# Patient Record
Sex: FEMALE | Race: WHITE | NOT HISPANIC OR LATINO | Employment: OTHER
[De-identification: names, ages, dates, MRNs, and addresses within clinical notes are randomized per-mention and may not be internally consistent; named-entity substitution may affect disease eponyms.]

---

## 2017-06-24 ENCOUNTER — HEALTH MAINTENANCE LETTER (OUTPATIENT)
Age: 62
End: 2017-06-24

## 2022-07-30 ENCOUNTER — HOSPITAL ENCOUNTER (INPATIENT)
Facility: CLINIC | Age: 67
LOS: 5 days | Discharge: HOME | DRG: 125 | End: 2022-08-04
Attending: EMERGENCY MEDICINE | Admitting: INTERNAL MEDICINE
Payer: COMMERCIAL

## 2022-07-30 ENCOUNTER — APPOINTMENT (OUTPATIENT)
Dept: CT IMAGING | Facility: CLINIC | Age: 67
DRG: 125 | End: 2022-07-30
Attending: EMERGENCY MEDICINE
Payer: COMMERCIAL

## 2022-07-30 DIAGNOSIS — Z11.52 ENCOUNTER FOR SCREENING LABORATORY TESTING FOR SEVERE ACUTE RESPIRATORY SYNDROME CORONAVIRUS 2 (SARS-COV-2): ICD-10-CM

## 2022-07-30 DIAGNOSIS — H05.011 ORBITAL CELLULITIS ON RIGHT: ICD-10-CM

## 2022-07-30 DIAGNOSIS — H05.00 ORBITAL INFLAMMATION, ACUTE: Primary | ICD-10-CM

## 2022-07-30 LAB
ANION GAP SERPL CALCULATED.3IONS-SCNC: 8 MMOL/L (ref 7–15)
BASOPHILS # BLD AUTO: 0.1 10E3/UL (ref 0–0.2)
BASOPHILS NFR BLD AUTO: 1 %
BUN SERPL-MCNC: 10 MG/DL (ref 8–23)
CALCIUM SERPL-MCNC: 9 MG/DL (ref 8.8–10.2)
CHLORIDE SERPL-SCNC: 101 MMOL/L (ref 98–107)
CREAT SERPL-MCNC: 0.77 MG/DL (ref 0.51–0.95)
CRP SERPL-MCNC: 26.8 MG/L
DEPRECATED HCO3 PLAS-SCNC: 27 MMOL/L (ref 22–29)
EOSINOPHIL # BLD AUTO: 0.3 10E3/UL (ref 0–0.7)
EOSINOPHIL NFR BLD AUTO: 3 %
ERYTHROCYTE [DISTWIDTH] IN BLOOD BY AUTOMATED COUNT: 11.7 % (ref 10–15)
GFR SERPL CREATININE-BSD FRML MDRD: 84 ML/MIN/1.73M2
GLUCOSE SERPL-MCNC: 128 MG/DL (ref 70–99)
HCT VFR BLD AUTO: 36.3 % (ref 35–47)
HGB BLD-MCNC: 12.1 G/DL (ref 11.7–15.7)
IMM GRANULOCYTES # BLD: 0 10E3/UL
IMM GRANULOCYTES NFR BLD: 0 %
LYMPHOCYTES # BLD AUTO: 1.8 10E3/UL (ref 0.8–5.3)
LYMPHOCYTES NFR BLD AUTO: 23 %
MCH RBC QN AUTO: 30.8 PG (ref 26.5–33)
MCHC RBC AUTO-ENTMCNC: 33.3 G/DL (ref 31.5–36.5)
MCV RBC AUTO: 92 FL (ref 78–100)
MONOCYTES # BLD AUTO: 0.7 10E3/UL (ref 0–1.3)
MONOCYTES NFR BLD AUTO: 9 %
NEUTROPHILS # BLD AUTO: 5.3 10E3/UL (ref 1.6–8.3)
NEUTROPHILS NFR BLD AUTO: 64 %
NRBC # BLD AUTO: 0 10E3/UL
NRBC BLD AUTO-RTO: 0 /100
PLATELET # BLD AUTO: 353 10E3/UL (ref 150–450)
POTASSIUM SERPL-SCNC: 4 MMOL/L (ref 3.4–5.3)
RBC # BLD AUTO: 3.93 10E6/UL (ref 3.8–5.2)
SARS-COV-2 RNA RESP QL NAA+PROBE: NEGATIVE
SODIUM SERPL-SCNC: 136 MMOL/L (ref 136–145)
TSH SERPL DL<=0.005 MIU/L-ACNC: 1.71 UIU/ML (ref 0.3–4.2)
WBC # BLD AUTO: 8.1 10E3/UL (ref 4–11)

## 2022-07-30 PROCEDURE — 82310 ASSAY OF CALCIUM: CPT | Performed by: EMERGENCY MEDICINE

## 2022-07-30 PROCEDURE — 96375 TX/PRO/DX INJ NEW DRUG ADDON: CPT | Performed by: EMERGENCY MEDICINE

## 2022-07-30 PROCEDURE — 250N000011 HC RX IP 250 OP 636: Performed by: EMERGENCY MEDICINE

## 2022-07-30 PROCEDURE — 86140 C-REACTIVE PROTEIN: CPT | Performed by: PHYSICIAN ASSISTANT

## 2022-07-30 PROCEDURE — 84443 ASSAY THYROID STIM HORMONE: CPT | Performed by: EMERGENCY MEDICINE

## 2022-07-30 PROCEDURE — 87040 BLOOD CULTURE FOR BACTERIA: CPT | Performed by: PHYSICIAN ASSISTANT

## 2022-07-30 PROCEDURE — 70480 CT ORBIT/EAR/FOSSA W/O DYE: CPT | Mod: 26 | Performed by: RADIOLOGY

## 2022-07-30 PROCEDURE — 250N000013 HC RX MED GY IP 250 OP 250 PS 637: Performed by: EMERGENCY MEDICINE

## 2022-07-30 PROCEDURE — 120N000002 HC R&B MED SURG/OB UMMC

## 2022-07-30 PROCEDURE — 36415 COLL VENOUS BLD VENIPUNCTURE: CPT | Performed by: EMERGENCY MEDICINE

## 2022-07-30 PROCEDURE — 99207 PR SERVICE NOT STAFFED W/SUPERV PROV: CPT | Performed by: STUDENT IN AN ORGANIZED HEALTH CARE EDUCATION/TRAINING PROGRAM

## 2022-07-30 PROCEDURE — G1010 CDSM STANSON: HCPCS

## 2022-07-30 PROCEDURE — 250N000013 HC RX MED GY IP 250 OP 250 PS 637: Performed by: PHYSICIAN ASSISTANT

## 2022-07-30 PROCEDURE — 99222 1ST HOSP IP/OBS MODERATE 55: CPT | Mod: AI | Performed by: INTERNAL MEDICINE

## 2022-07-30 PROCEDURE — 99207 PR APP CREDIT; MD BILLING SHARED VISIT: CPT | Performed by: PHYSICIAN ASSISTANT

## 2022-07-30 PROCEDURE — C9803 HOPD COVID-19 SPEC COLLECT: HCPCS | Performed by: EMERGENCY MEDICINE

## 2022-07-30 PROCEDURE — 99285 EMERGENCY DEPT VISIT HI MDM: CPT | Performed by: EMERGENCY MEDICINE

## 2022-07-30 PROCEDURE — 99285 EMERGENCY DEPT VISIT HI MDM: CPT | Mod: 25 | Performed by: EMERGENCY MEDICINE

## 2022-07-30 PROCEDURE — U0003 INFECTIOUS AGENT DETECTION BY NUCLEIC ACID (DNA OR RNA); SEVERE ACUTE RESPIRATORY SYNDROME CORONAVIRUS 2 (SARS-COV-2) (CORONAVIRUS DISEASE [COVID-19]), AMPLIFIED PROBE TECHNIQUE, MAKING USE OF HIGH THROUGHPUT TECHNOLOGIES AS DESCRIBED BY CMS-2020-01-R: HCPCS | Performed by: EMERGENCY MEDICINE

## 2022-07-30 PROCEDURE — 96366 THER/PROPH/DIAG IV INF ADDON: CPT | Performed by: EMERGENCY MEDICINE

## 2022-07-30 PROCEDURE — 258N000003 HC RX IP 258 OP 636: Performed by: EMERGENCY MEDICINE

## 2022-07-30 PROCEDURE — 96365 THER/PROPH/DIAG IV INF INIT: CPT | Mod: 59 | Performed by: EMERGENCY MEDICINE

## 2022-07-30 PROCEDURE — 36415 COLL VENOUS BLD VENIPUNCTURE: CPT | Performed by: PHYSICIAN ASSISTANT

## 2022-07-30 PROCEDURE — 85025 COMPLETE CBC W/AUTO DIFF WBC: CPT | Performed by: EMERGENCY MEDICINE

## 2022-07-30 PROCEDURE — 96361 HYDRATE IV INFUSION ADD-ON: CPT | Performed by: EMERGENCY MEDICINE

## 2022-07-30 RX ORDER — AMOXICILLIN 250 MG
1 CAPSULE ORAL 2 TIMES DAILY PRN
Status: DISCONTINUED | OUTPATIENT
Start: 2022-07-30 | End: 2022-08-04 | Stop reason: HOSPADM

## 2022-07-30 RX ORDER — ALENDRONATE SODIUM 70 MG/1
70 TABLET ORAL
Status: ON HOLD | COMMUNITY
End: 2022-08-04

## 2022-07-30 RX ORDER — IOPAMIDOL 755 MG/ML
75 INJECTION, SOLUTION INTRAVASCULAR ONCE
Status: COMPLETED | OUTPATIENT
Start: 2022-07-30 | End: 2022-07-30

## 2022-07-30 RX ORDER — MORPHINE SULFATE 4 MG/ML
4 INJECTION, SOLUTION INTRAMUSCULAR; INTRAVENOUS
Status: COMPLETED | OUTPATIENT
Start: 2022-07-30 | End: 2022-07-30

## 2022-07-30 RX ORDER — CETIRIZINE HYDROCHLORIDE 5 MG/1
5 TABLET ORAL DAILY
Status: DISCONTINUED | OUTPATIENT
Start: 2022-07-31 | End: 2022-08-04 | Stop reason: HOSPADM

## 2022-07-30 RX ORDER — ACETAMINOPHEN 325 MG/1
975 TABLET ORAL EVERY 6 HOURS PRN
Status: DISCONTINUED | OUTPATIENT
Start: 2022-07-30 | End: 2022-07-31

## 2022-07-30 RX ORDER — BIOTIN 1 MG
1000 TABLET ORAL DAILY
COMMUNITY

## 2022-07-30 RX ORDER — ACETAMINOPHEN 325 MG/1
975 TABLET ORAL ONCE
Status: COMPLETED | OUTPATIENT
Start: 2022-07-30 | End: 2022-07-30

## 2022-07-30 RX ORDER — PIPERACILLIN SODIUM, TAZOBACTAM SODIUM 4; .5 G/20ML; G/20ML
4.5 INJECTION, POWDER, LYOPHILIZED, FOR SOLUTION INTRAVENOUS EVERY 8 HOURS
Status: DISCONTINUED | OUTPATIENT
Start: 2022-07-30 | End: 2022-08-03

## 2022-07-30 RX ORDER — AZELASTINE HYDROCHLORIDE 137 UG/1
137 SPRAY, METERED NASAL DAILY PRN
COMMUNITY

## 2022-07-30 RX ORDER — ONDANSETRON 2 MG/ML
4 INJECTION INTRAMUSCULAR; INTRAVENOUS EVERY 6 HOURS PRN
Status: DISCONTINUED | OUTPATIENT
Start: 2022-07-30 | End: 2022-08-04 | Stop reason: HOSPADM

## 2022-07-30 RX ORDER — TRAZODONE HYDROCHLORIDE 100 MG/1
100 TABLET ORAL AT BEDTIME
COMMUNITY

## 2022-07-30 RX ORDER — ONDANSETRON 4 MG/1
4 TABLET, ORALLY DISINTEGRATING ORAL EVERY 6 HOURS PRN
Status: DISCONTINUED | OUTPATIENT
Start: 2022-07-30 | End: 2022-08-04 | Stop reason: HOSPADM

## 2022-07-30 RX ORDER — TRAZODONE HYDROCHLORIDE 100 MG/1
100 TABLET ORAL AT BEDTIME
Status: DISCONTINUED | OUTPATIENT
Start: 2022-07-30 | End: 2022-08-04 | Stop reason: HOSPADM

## 2022-07-30 RX ORDER — SODIUM CHLORIDE 9 MG/ML
INJECTION, SOLUTION INTRAVENOUS CONTINUOUS
Status: DISCONTINUED | OUTPATIENT
Start: 2022-07-30 | End: 2022-07-30

## 2022-07-30 RX ORDER — VITAMIN B COMPLEX
25 TABLET ORAL DAILY
Status: DISCONTINUED | OUTPATIENT
Start: 2022-07-31 | End: 2022-07-30

## 2022-07-30 RX ORDER — OXYCODONE HYDROCHLORIDE 5 MG/1
5 TABLET ORAL EVERY 4 HOURS PRN
Status: DISCONTINUED | OUTPATIENT
Start: 2022-07-30 | End: 2022-07-31

## 2022-07-30 RX ORDER — MONTELUKAST SODIUM 10 MG/1
10 TABLET ORAL AT BEDTIME
Status: DISCONTINUED | OUTPATIENT
Start: 2022-07-30 | End: 2022-07-30

## 2022-07-30 RX ORDER — LIDOCAINE 40 MG/G
CREAM TOPICAL
Status: DISCONTINUED | OUTPATIENT
Start: 2022-07-30 | End: 2022-08-04 | Stop reason: HOSPADM

## 2022-07-30 RX ORDER — AMOXICILLIN 250 MG
2 CAPSULE ORAL 2 TIMES DAILY PRN
Status: DISCONTINUED | OUTPATIENT
Start: 2022-07-30 | End: 2022-08-04 | Stop reason: HOSPADM

## 2022-07-30 RX ORDER — FEXOFENADINE HCL 180 MG/1
180 TABLET ORAL DAILY
COMMUNITY

## 2022-07-30 RX ORDER — AZELASTINE 1 MG/ML
2 SPRAY, METERED NASAL 2 TIMES DAILY
Status: DISCONTINUED | OUTPATIENT
Start: 2022-07-30 | End: 2022-07-31

## 2022-07-30 RX ADMIN — AZELASTINE HYDROCHLORIDE 2 SPRAY: 137 SPRAY, METERED NASAL at 22:03

## 2022-07-30 RX ADMIN — MORPHINE SULFATE 4 MG: 4 INJECTION INTRAVENOUS at 18:37

## 2022-07-30 RX ADMIN — ACETAMINOPHEN 975 MG: 325 TABLET, FILM COATED ORAL at 18:36

## 2022-07-30 RX ADMIN — SODIUM CHLORIDE: 9 INJECTION, SOLUTION INTRAVENOUS at 15:05

## 2022-07-30 RX ADMIN — IOPAMIDOL 75 ML: 755 INJECTION, SOLUTION INTRAVENOUS at 14:49

## 2022-07-30 RX ADMIN — PIPERACILLIN AND TAZOBACTAM 4.5 G: 4; .5 INJECTION, POWDER, FOR SOLUTION INTRAVENOUS at 19:36

## 2022-07-30 RX ADMIN — TRAZODONE HYDROCHLORIDE 100 MG: 100 TABLET ORAL at 22:03

## 2022-07-30 ASSESSMENT — ACTIVITIES OF DAILY LIVING (ADL)
ADLS_ACUITY_SCORE: 35

## 2022-07-30 NOTE — ED TRIAGE NOTES
Pressure over right eye and jaw Thursday that she thought was allergies. Yesterday her right eye hurt and was swollen,not able to focus for about 15-20 minutes before it resolved. This morning more swollen around eye and has some scleral edema. Feels like she may have a sinus infection.

## 2022-07-30 NOTE — CONSULTS
OPHTHALMOLOGY CONSULT NOTE  07/30/22    Patient: Alfreda Wing  Consulted by: Dr. Connolly  Reason for Consult: Right eye pain, swelling    HISTORY OF PRESENTING ILLNESS:     Alfreda Wing is a 67 year old female who presents for right eye periorbital edema, pain with EOMs and new right eye proptosis.    She has been having sinus problems (including sneezing, runny nose, pressure headaches) for the past few months that have worsened in the past 3 weeks. She started getting pain in her eyebrow, pressure in her ear, etc. Saw her PCP 2 weeks ago and allergist who thought it was sinus infection vs allergies. She started noticing worsening pain above the right eye on Wednesday 7/26/22. On Thursday 7/27/22 she noticed a little bit of swelling. Then when she awoke yesterday 7/29/22, she noticed her right eye was much more swollen. She has continued pain in that eye, worse with EOMs and looking to the right. She also started noticing blurry vision in the right eye at that time. She denies any discharge, flashes, floaters, loss of vision. She endorses binocular diplopia when looking left and right. She describes it as horizontal diplopia. It resolves when covering each eye. Denies any fevers, chills, n/v, jaw pain/claudication, difficulty standing from sitting, numbness, weakness, tingling. No trauma.      Review of systems were otherwise negative except for that which has been stated above.      OCULAR/MEDICAL/SURGICAL HISTORIES:     Past Ocular History:  Last eye exam: Fall 2021, follows with [facility]  Prior eye surgery/laser: Cataract extraction both eyes in 2018  Contact lens wear: None  Glasses: [place]  Eyedrops: None    Family History:  Mother, aunt, uncle with AMD. Father with macular hole?    Social History:  Never smoker. ,  in the room.    Medical History  - Osteoporosis: started weekly fosamax 1 week ago  - No diabetes or HTN      EXAMINATION:     Base Eye Exam     Visual Acuity        Right Left    Near cc 20/20-2 20/20-3          Tonometry (Tonopen, 2:44 PM)       Right Left    Pressure 23 21          Pupils       Pupils Dark Light Shape React APD    Right PERRL 4 2 Round Brisk None    Left PERRL 4 2 Round Brisk None          Visual Fields       Left Right     Full Full          Extraocular Movement       Right Left     Ortho Ortho     -.5 0 -.5   -.5  -.5   -.5 0 -.5    0 0 0   0  0   0 0 0      EOMs limited by pain           Dilation     Both eyes: 1.0% Mydriacyl, 2.5% Tj Synephrine @ 2:45 PM            Additional Tests     Color       Right Left    [place] 11/11 11/11            Slit Lamp and Fundus Exam     External Exam       Right Left    External Periorbital edema Normal          Slit Lamp Exam       Right Left    Lids/Lashes Ptosis worse on right, Upper and lower lid edema, minimal erythema, no TTP Ptosis    Conjunctiva/Sclera White and quiet White and quiet    Cornea Clear Clear    Anterior Chamber Tr cell post dilation Deep and quiet    Iris Dilated Dilated    Lens PCIOL, PCO inferonasal and superior PCIOL, s/p YAG    Vitreous Normal Normal          Fundus Exam       Right Left    Disc Normal temporal ppa    C/D Ratio 0.3 0.35    Macula Normal, blonde fundus Normal, blonde fundus    Vessels Normal Normal    Periphery Normal Normal                Labs/Studies/Imaging Performed:    TSH 1.71 (N)    CT orbit without contrast (7/30/22):  IMPRESSION: Right proptosis. Otherwise unremarkable exam.     ASSESSMENT/PLAN:     Alfreda Wing is a 67 year old female who presents with     # Right eye periorbital edema, proptosis  # Binocular diplopia  Patient presenting with progressive worsening of right eye periorbital edema and pain for the past 3 days. Recent history of sinus infections vs allergies worsening the past 3 weeks. Exam today notable for significant pain with EOMs as well as periorbital edema. Differential includes a preseptal vs orbital cellulitis vs orbital mass vs orbital  inflammatory syndrome vs thyroid eye disease. VA 20/20, color vision full, PERRL without APD all of which are reassuring. TSH within normal limits. Regardless, pain with EOMs and binocular diplopia in left and right gaze concerning for an orbital process. Orbital and preseptal cellulitis are high on the differential considering recent history of sinus infections. CT orbit without contrast done, only notable for proptosis and mucosal thickening of bilateral maxillary sinuses and ethmoid air cells. Regardless, clinical exam concerning for orbital cellulitis so we will treat accordingly.    RECOMMENDATIONS:  - Admit to medicine for IV antibiotic therapy with IV Zosyn 4.5g q8h  - Nasal decongestant spray TID   - Ophthalmology will continue to follow daily at this time    It is our pleasure to participate in this patient's care and treatment. Please contact us with any further questions or concerns.    Patient seen and discussed with senior resident Sujey Whalen MD and discussed with fellow Clarice Sánchez MD.    Luzmaria Park MD  Ophthalmology Resident, PGY-2

## 2022-07-31 ENCOUNTER — APPOINTMENT (OUTPATIENT)
Dept: MRI IMAGING | Facility: CLINIC | Age: 67
DRG: 125 | End: 2022-07-31
Payer: COMMERCIAL

## 2022-07-31 LAB
ALBUMIN SERPL BCG-MCNC: 3.8 G/DL (ref 3.5–5.2)
ALP SERPL-CCNC: 50 U/L (ref 35–104)
ALT SERPL W P-5'-P-CCNC: 19 U/L (ref 10–35)
ANION GAP SERPL CALCULATED.3IONS-SCNC: 8 MMOL/L (ref 7–15)
AST SERPL W P-5'-P-CCNC: 21 U/L (ref 10–35)
BILIRUB SERPL-MCNC: 0.4 MG/DL
BUN SERPL-MCNC: 8 MG/DL (ref 8–23)
CALCIUM SERPL-MCNC: 7.9 MG/DL (ref 8.8–10.2)
CHLORIDE SERPL-SCNC: 105 MMOL/L (ref 98–107)
CREAT SERPL-MCNC: 0.75 MG/DL (ref 0.51–0.95)
DEPRECATED HCO3 PLAS-SCNC: 25 MMOL/L (ref 22–29)
ERYTHROCYTE [DISTWIDTH] IN BLOOD BY AUTOMATED COUNT: 11.8 % (ref 10–15)
GFR SERPL CREATININE-BSD FRML MDRD: 86 ML/MIN/1.73M2
GLUCOSE SERPL-MCNC: 96 MG/DL (ref 70–99)
HCT VFR BLD AUTO: 35.3 % (ref 35–47)
HGB BLD-MCNC: 11.4 G/DL (ref 11.7–15.7)
MCH RBC QN AUTO: 30.1 PG (ref 26.5–33)
MCHC RBC AUTO-ENTMCNC: 32.3 G/DL (ref 31.5–36.5)
MCV RBC AUTO: 93 FL (ref 78–100)
PLATELET # BLD AUTO: 336 10E3/UL (ref 150–450)
POTASSIUM SERPL-SCNC: 3.8 MMOL/L (ref 3.4–5.3)
PROT SERPL-MCNC: 6.3 G/DL (ref 6.4–8.3)
RBC # BLD AUTO: 3.79 10E6/UL (ref 3.8–5.2)
SODIUM SERPL-SCNC: 138 MMOL/L (ref 136–145)
WBC # BLD AUTO: 7.5 10E3/UL (ref 4–11)

## 2022-07-31 PROCEDURE — 250N000011 HC RX IP 250 OP 636: Performed by: STUDENT IN AN ORGANIZED HEALTH CARE EDUCATION/TRAINING PROGRAM

## 2022-07-31 PROCEDURE — 85027 COMPLETE CBC AUTOMATED: CPT | Performed by: PHYSICIAN ASSISTANT

## 2022-07-31 PROCEDURE — 250N000011 HC RX IP 250 OP 636: Performed by: EMERGENCY MEDICINE

## 2022-07-31 PROCEDURE — 250N000013 HC RX MED GY IP 250 OP 250 PS 637: Performed by: PHYSICIAN ASSISTANT

## 2022-07-31 PROCEDURE — 99232 SBSQ HOSP IP/OBS MODERATE 35: CPT | Performed by: STUDENT IN AN ORGANIZED HEALTH CARE EDUCATION/TRAINING PROGRAM

## 2022-07-31 PROCEDURE — 70546 MR ANGIOGRAPH HEAD W/O&W/DYE: CPT

## 2022-07-31 PROCEDURE — 250N000013 HC RX MED GY IP 250 OP 250 PS 637: Performed by: STUDENT IN AN ORGANIZED HEALTH CARE EDUCATION/TRAINING PROGRAM

## 2022-07-31 PROCEDURE — 36415 COLL VENOUS BLD VENIPUNCTURE: CPT | Performed by: PHYSICIAN ASSISTANT

## 2022-07-31 PROCEDURE — A9585 GADOBUTROL INJECTION: HCPCS | Performed by: STUDENT IN AN ORGANIZED HEALTH CARE EDUCATION/TRAINING PROGRAM

## 2022-07-31 PROCEDURE — 70546 MR ANGIOGRAPH HEAD W/O&W/DYE: CPT | Mod: 26 | Performed by: RADIOLOGY

## 2022-07-31 PROCEDURE — 255N000002 HC RX 255 OP 636: Performed by: STUDENT IN AN ORGANIZED HEALTH CARE EDUCATION/TRAINING PROGRAM

## 2022-07-31 PROCEDURE — 80053 COMPREHEN METABOLIC PANEL: CPT | Performed by: PHYSICIAN ASSISTANT

## 2022-07-31 PROCEDURE — 120N000002 HC R&B MED SURG/OB UMMC

## 2022-07-31 RX ORDER — GADOBUTROL 604.72 MG/ML
0.1 INJECTION INTRAVENOUS ONCE
Status: COMPLETED | OUTPATIENT
Start: 2022-07-31 | End: 2022-07-31

## 2022-07-31 RX ORDER — IBUPROFEN 200 MG
400 TABLET ORAL EVERY 6 HOURS PRN
Status: DISCONTINUED | OUTPATIENT
Start: 2022-07-31 | End: 2022-08-04 | Stop reason: HOSPADM

## 2022-07-31 RX ORDER — ACETAMINOPHEN 325 MG/1
975 TABLET ORAL EVERY 6 HOURS
Status: DISCONTINUED | OUTPATIENT
Start: 2022-07-31 | End: 2022-08-04 | Stop reason: HOSPADM

## 2022-07-31 RX ORDER — ENOXAPARIN SODIUM 100 MG/ML
40 INJECTION SUBCUTANEOUS EVERY 24 HOURS
Status: DISCONTINUED | OUTPATIENT
Start: 2022-07-31 | End: 2022-08-04 | Stop reason: HOSPADM

## 2022-07-31 RX ADMIN — AZELASTINE HYDROCHLORIDE 2 SPRAY: 137 SPRAY, METERED NASAL at 07:37

## 2022-07-31 RX ADMIN — PIPERACILLIN AND TAZOBACTAM 4.5 G: 4; .5 INJECTION, POWDER, FOR SOLUTION INTRAVENOUS at 03:17

## 2022-07-31 RX ADMIN — ACETAMINOPHEN 975 MG: 325 TABLET, FILM COATED ORAL at 22:13

## 2022-07-31 RX ADMIN — ACETAMINOPHEN 975 MG: 325 TABLET, FILM COATED ORAL at 15:32

## 2022-07-31 RX ADMIN — PIPERACILLIN AND TAZOBACTAM 4.5 G: 4; .5 INJECTION, POWDER, FOR SOLUTION INTRAVENOUS at 12:03

## 2022-07-31 RX ADMIN — GADOBUTROL 5.9 ML: 604.72 INJECTION INTRAVENOUS at 18:33

## 2022-07-31 RX ADMIN — IBUPROFEN 400 MG: 200 TABLET, FILM COATED ORAL at 21:11

## 2022-07-31 RX ADMIN — OXYCODONE HYDROCHLORIDE 5 MG: 5 TABLET ORAL at 03:14

## 2022-07-31 RX ADMIN — ACETAMINOPHEN 975 MG: 325 TABLET, FILM COATED ORAL at 03:14

## 2022-07-31 RX ADMIN — IBUPROFEN 400 MG: 200 TABLET, FILM COATED ORAL at 11:01

## 2022-07-31 RX ADMIN — ENOXAPARIN SODIUM 40 MG: 40 INJECTION SUBCUTANEOUS at 15:32

## 2022-07-31 RX ADMIN — CETIRIZINE HYDROCHLORIDE 5 MG: 5 TABLET ORAL at 07:37

## 2022-07-31 RX ADMIN — PIPERACILLIN AND TAZOBACTAM 4.5 G: 4; .5 INJECTION, POWDER, FOR SOLUTION INTRAVENOUS at 20:27

## 2022-07-31 RX ADMIN — TRAZODONE HYDROCHLORIDE 100 MG: 100 TABLET ORAL at 22:13

## 2022-07-31 ASSESSMENT — ACTIVITIES OF DAILY LIVING (ADL)
VISION_MANAGEMENT: GLASSES
ADLS_ACUITY_SCORE: 35
CHANGE_IN_FUNCTIONAL_STATUS_SINCE_ONSET_OF_CURRENT_ILLNESS/INJURY: NO
ADLS_ACUITY_SCORE: 35
ADLS_ACUITY_SCORE: 20
ADLS_ACUITY_SCORE: 35
ADLS_ACUITY_SCORE: 35
DIFFICULTY_EATING/SWALLOWING: NO
ADLS_ACUITY_SCORE: 35
DRESSING/BATHING_DIFFICULTY: NO
WALKING_OR_CLIMBING_STAIRS_DIFFICULTY: NO
ADLS_ACUITY_SCORE: 20
WEAR_GLASSES_OR_BLIND: YES
ADLS_ACUITY_SCORE: 35
CONCENTRATING,_REMEMBERING_OR_MAKING_DECISIONS_DIFFICULTY: NO
DOING_ERRANDS_INDEPENDENTLY_DIFFICULTY: NO
DIFFICULTY_COMMUNICATING: NO
FALL_HISTORY_WITHIN_LAST_SIX_MONTHS: NO
ADLS_ACUITY_SCORE: 20
TOILETING_ISSUES: NO
ADLS_ACUITY_SCORE: 35
HEARING_DIFFICULTY_OR_DEAF: NO
ADLS_ACUITY_SCORE: 35
ADLS_ACUITY_SCORE: 20

## 2022-07-31 ASSESSMENT — ENCOUNTER SYMPTOMS
NEUROLOGICAL NEGATIVE: 1
EYE PAIN: 1
EYE REDNESS: 1
CARDIOVASCULAR NEGATIVE: 1
EYE DISCHARGE: 0
SINUS PRESSURE: 1
FEVER: 0
PHOTOPHOBIA: 0

## 2022-07-31 NOTE — PROGRESS NOTES
[facility]    Medicine Progress Note - Hospitalist Service, GOLD TEAM 8    Date of Admission:  7/30/2022    Assessment & Plan        Alfreda Wing is a 67 year old female admitted on 7/30/2022. She has a past medical history significant for osteoporosis, alcohol use, insomnia and seasonal allergies who presented to the ED with right eye periorbital edema and proptosis as well as binocular diplopia with clinical concerns for orbital cellulitis.    Orbital cellulitis of right eye  Right proptosis  Increased pain, swelling and redness of right in with proptosis in setting of allergies and sinus congestion. No fevers. CT orbits demonstrating right proptosis and mild mucosal thickening of bilateral maxillary sinuses and ethmoids. Ophthalmology consulted in ED who recommended treating for likely orbital cellulitis.   - Ophthalmology following, recommend MR brain and orbit-MRV to evaluate cavernous sinus.   - IV zosyn 4.5g q8h  - BCx2 (will be post-zosyn x1), CRP  - Tylenol 975 mg q6h PRN, ibuprofen q6h PRN, oxycodone 5 mg q4h PRN for severe pain    Seasonal allergies  Have been worse this year. Seen by allergist and PCP in week leading up to admission who felt symptoms related to allergies vs sinus infection. Had been prescribed montelukast but insurance wouldn't cover.   - Continue PTA zyrtec    Alcohol use disorder, in early remission  Has struggled with alcohol use and working with PCP for resources. Last drink on 7/25. Prior to this drinking 2-3 bottles of wine approximately 4 days per week. Low suspicion for withdrawal at this time.    Insomnia - Continue PTA trazodone 100 mg at bedtime  Osteoporosis - Takes fosamax on Mondays. Per patient, okay to hold vit D while admitted.       Diet: Regular Diet Adult    DVT Prophylaxis: Enoxaparin (Lovenox) SQ  Johnson Catheter: Not present  Central Lines: None  Cardiac Monitoring: None  Code Status: No CPR- Pre-arrest intubation OK       Disposition Plan      Expected Discharge Date: 08/02/2022                The patient's care was discussed with the Patient and Ophthalmology Consultant.    Phuong Bonilla MD  Hospitalist Service, GOLD TEAM 8  [facility]  Securely message with the Vocera Web Console (learn more here)  Text page via [facility] Paging/Directory   Please see signed in provider for up to date coverage information      Clinically Significant Risk Factors Present on Admission                          ______________________________________________________________________    Interval History   - Swelling has decreased, but still having significant pain with extraocular movements particularly when looking to the far right. Vision is stable.  - No fevers or chills.    Data reviewed today: I reviewed all medications, new labs and imaging results over the last 24 hours. I personally reviewed no images or EKG's today.    Physical Exam   Vital Signs: Temp: 97.5  F (36.4  C) Temp src: Oral BP: 113/70 Pulse: 76   Resp: 16 SpO2: 99 % O2 Device: None (Room air)    Weight: 131 lbs 0 oz  General Appearance: Alert, no distress  Eyes: PERRL, periorbital edema of R eye, redness of R conjunctival/sclera. L eye normal.   Respiratory: CTAB  Cardiovascular: RRR, no murmus  GI: soft, non-tender  Skin: warm and dry      Data   Recent Labs   Lab 07/31/22  0550 07/30/22  1312   WBC 7.5 8.1   HGB 11.4* 12.1   MCV 93 92    353    136   POTASSIUM 3.8 4.0   CHLORIDE 105 101   CO2 25 27   BUN 8.0 10.0   CR 0.75 0.77   ANIONGAP 8 8   DONIS 7.9* 9.0   GLC 96 128*   ALBUMIN 3.8  --    PROTTOTAL 6.3*  --    BILITOTAL 0.4  --    ALKPHOS 50  --    ALT 19  --    AST 21  --

## 2022-07-31 NOTE — PROGRESS NOTES
OPHTHALMOLOGY CONSULT PROGRESS NOTE  07/31/22    Patient: Alfreda Wing  Consulted by: Dr. Connolly  Reason for Consult: Right eye pain, swelling      INTERVAL HISTORY (7/1/22):     Patient feeling like things have improved since starting IV antibiotics. Less pain with EOMs but still some pain. Worst pain still in right gaze. Does feel like the swelling around the eye has decreased as well. No flashes, floaters, vision loss. No drainage from the eye. Her breathing does feel more clear since starting decongestant.    HISTORY OF PRESENTING ILLNESS:     Alfreda Wing is a 67 year old female who presents for right eye periorbital edema, pain with EOMs and new right eye proptosis.    She has been having sinus problems (including sneezing, runny nose, pressure headaches) for the past few months that have worsened in the past 3 weeks. She started getting pain in her eyebrow, pressure in her ear, etc. Saw her PCP 2 weeks ago and allergist who thought it was sinus infection vs allergies. She started noticing worsening pain above the right eye on Wednesday 7/26/22. On Thursday 7/27/22 she noticed a little bit of swelling. Then when she awoke yesterday 7/29/22, she noticed her right eye was much more swollen. She has continued pain in that eye, worse with EOMs and looking to the right. She also started noticing blurry vision in the right eye at that time. She denies any discharge, flashes, floaters, loss of vision. She endorses binocular diplopia when looking left and right. She describes it as horizontal diplopia. It resolves when covering each eye. Denies any fevers, chills, n/v, jaw pain/claudication, difficulty standing from sitting, numbness, weakness, tingling. No trauma.      Review of systems were otherwise negative except for that which has been stated above.      EXAMINATION:     Base Eye Exam     Visual Acuity       Right Left    Near cc 20/20 20/20          Tonometry (Tonopen, 11:15 AM)       Right Left     Pressure 17 17          Pupils       Pupils    Right PERRL    Left PERRL          Visual Fields       Left Right     Full Full          Extraocular Movement       Right Left     - trace - trace - trace   -1  0    - trace - trace - trace    0 0 0   0  0   0 0 0               Slit Lamp and Fundus Exam     External Exam       Right Left    External Improving periorbital edema Normal          Slit Lamp Exam       Right Left    Lids/Lashes Ptosis worse on right, Upper and lower lid edema improved, erythema resolved, no TTP Ptosis    Conjunctiva/Sclera Tr injection White and quiet    Cornea Clear Clear    Anterior Chamber Deep and quiet Deep and quiet    Iris Round and reactive Round and reactive    Lens PCIOL, PCO inferonasal and superior PCIOL, s/p YAG    Vitreous Normal Normal                Labs/Studies/Imaging Performed:    TSH 1.71 (N)    CT orbit without contrast (7/30/22):  IMPRESSION: Right proptosis. Otherwise unremarkable exam.     ASSESSMENT/PLAN:     Alfreda Wing is a 67 year old female who presents with     # Right eye periorbital edema, proptosis  # Binocular diplopia  Patient presenting with progressive worsening of right eye periorbital edema and pain for the past 3 days. Recent history of sinus infections vs allergies worsening the past 3 weeks. Exam today notable for significant pain with EOMs as well as periorbital edema. Differential includes a preseptal vs orbital cellulitis vs orbital mass vs orbital inflammatory syndrome vs thyroid eye disease. VA 20/20, color vision full, PERRL without APD all of which are reassuring. TSH within normal limits. Regardless, pain with EOMs and binocular diplopia in left and right gaze concerning for an orbital process. Orbital and preseptal cellulitis are high on the differential considering recent history of sinus infections. CT orbit without contrast done, only notable for proptosis and mucosal thickening of bilateral maxillary sinuses and ethmoid air cells.  Regardless, clinical exam concerning for orbital cellulitis so we will treat accordingly.    7/31/22: Patient still with painful EOMs and diplopia, worse in abduction. No diplopia in primary gaze. Still with ptosis worse in the right eye. Symptoms concerning for multiple cranial nerve palsies and concern for cavernous sinus involvement.    RECOMMENDATIONS:  - MRI/MRV brain and orbits with and without contrast  - Continue IV Zosyn 4.5g q8h  - Nasal decongestant spray TID   - Ophthalmology will continue to follow daily at this time    It is our pleasure to participate in this patient's care and treatment. Please contact us with any further questions or concerns.    Patient seen and discussed with senior resident Sujey Whalen MD and discussed with fellow Clarice Sánchez MD.    Luzmarai Park MD  Ophthalmology Resident, PGY-2

## 2022-07-31 NOTE — PROGRESS NOTES
"Shift: [id number]    VS: /65 (BP Location: Left arm)   Pulse 64   Temp 97.5  F (36.4  C) (Oral)   Resp 16   Ht 1.575 m (5' 2\")   Wt 59.4 kg (131 lb)   SpO2 98%   BMI 23.96 kg/m      Pain: 7/10 right eye, takes tylenol and oxycodone for pain  Neuro:A&O x4  Cardio: WDL  Respiratory: Room air   Diet: Regular  GI/: Able to walk to bathroom independently   LDA; PIV Right arm SL   Skin: Swelling around right eye  Activity: Independent  Tests:  Labs: Blood cultures in process, however 1 dose of zosyn was given prior to blood culture collection, MD aware  Plan: Zosyn q 8 hours, ophthalmology to follow daily    "

## 2022-07-31 NOTE — H&P
[facility]    History and Physical - Hospitalist Service, GOLD TEAM        Date of Admission:  7/30/2022    Assessment & Plan      Alfreda Wing is a 67 year old female admitted on 7/30/2022. She has a past medical history significant for osteoporosis, alcohol use, insomnia and seasonal allergies who presented to the ED with right eye periorbital edema and proptosis as well as binocular diplopia with clinical concerns for orbital cellulitis.    Orbital cellulitis of right eye  Right proptosis  Increased pain, swelling and redness of right in with proptosis in setting of allergies and sinus congestion. No fevers. CT orbits demonstrating right proptosis and mild mucosal thickening of bilateral maxillary sinuses and ethmoids. Ophthalmology consulted in ED who recommended treating for likely orbital cellulitis.   - Ophthalmology following  - IV zosyn 4.5g q8h  - BCx2 (will be post-zosyn x1), CRP  - Tylenol 975 mg q6h PRN, oxycodone 5 mg q4h PRN for severe pain    Seasonal allergies  Have been worse this year. Seen by allergist and PCP in week leading up to admission who felt symptoms related to allergies vs sinus infection. Had been prescribed montelukast but insurance wouldn't cover.   - Continue PTA zyrtec, azelastine spray    Alcohol use disorder, in early remission  Has struggled with alcohol use and working with PCP for resources. Last drink on 7/25. Prior to this drinking 2-3 bottles of wine approximately 4 days per week. Low suspicion for withdrawal at this time.    Insomnia - Continue PTA trazodone 100 mg at bedtime  Osteoporosis - Takes fosamax on Mondays. Per patient, okay to hold vit D while admitted.       Diet: Regular Diet Adult  DVT Prophylaxis: Pneumatic Compression Devices  Johnson Catheter: Not present  Central Lines: None  Cardiac Monitoring: None  Code Status: No CPR- Pre-arrest intubation OK    Clinically Significant Risk Factors Present on Admission                           Disposition Plan      Expected Discharge Date: 08/02/2022                The patient's care was discussed with the Attending Physician, Dr. Khan, Patient and Patient's Family.    Marielena Rincon PA-C  Hospitalist Service, [facility]  Securely message with the Vocera Web Console (learn more here)  Text page via [facility] Paging/Directory   Please see signed in provider for up to date coverage information  ______________________________________________________________________    Chief Complaint   Right eye pain    History is obtained from the patient    History of Present Illness   Alfreda Wing is a 67 year old female who presented to the ED today with a 3-4 days of right eye pain. Has been struggling with severe allergies this year and notes in the past few weeks increased sinus pressure and pain in her ear. Was seen by both her PCP and an allergist in the past few days who felt symptoms were related to allergies. Subsequently developed worse pain above her right eye on Wednesday and has progressed to swelling and pain surround her eye, inability to close her right eye and diplopia with EOMs. Visual acuity is otherwise intact at rest. Does still have pain in her right ear as well.     In regards to alcohol use, reports last drink was on Monday with 2-3 glasses of wine and has been sober since. First week of July had binged (1-2 bottles of wine) approximately 4 days a week.     Review of Systems    The 10 point Review of Systems is negative other than noted in the HPI or here.     Past Medical History    I have reviewed this patient's medical history and updated it with pertinent information if needed.   No past medical history on file.    Past Surgical History   I have reviewed this patient's surgical history and updated it with pertinent information if needed.  No past surgical history on file.    Social History   I have reviewed this  patient's social history and updated it with pertinent information if needed.       Family History     Family history of stroke in grandparent.     Prior to Admission Medications   Prior to Admission Medications   Prescriptions Last Dose Informant Patient Reported? Taking?   Azelastine HCl 137 MCG/SPRAY SOLN Unknown at Unknown time Self Yes Yes   Sig: Spray 137 mcg in nostril daily as needed   alendronate (FOSAMAX) 70 MG tablet  Self Yes Yes   Sig: Take 70 mg by mouth every 7 days   biotin 1000 MCG TABS tablet Unknown at Unknown time Self Yes Yes   Sig: Take 1,000 mcg by mouth daily   fexofenadine (ALLEGRA) 180 MG tablet Unknown at am Self Yes Yes   Sig: Take 180 mg by mouth daily   traZODone (DESYREL) 100 MG tablet Unknown at pm Self Yes Yes   Sig: Take 100 mg by mouth At Bedtime      Facility-Administered Medications: None     Allergies   Allergies   Allergen Reactions     Clarithromycin Hives     Doxycycline Hives     Sulfa Drugs Hives       Physical Exam   Vital Signs: Temp: 97.5  F (36.4  C) Temp src: Oral BP: 120/60 Pulse: 69   Resp: 16 SpO2: 97 % O2 Device: None (Room air)    Weight: 131 lbs 0 oz    General Appearance: Awake. Alert and oriented x4. No acute distress.  Eyes: Normal lids. Right eye with scleral injection and tearing. Mild ptosis of right eye. Some mild erythema and edema surround right eye.   HEENT: Normo cephalic, atraumatic. Nares patent. Oropharynx clear. Mucous membranes moist.  Respiratory: Normal work of breathing on room air. Lungs CTAB. No wheezes.  Cardiovascular: RRR. S1, S2. No murmurs. Pedal pulses 2+ No lower extremity edema.  GI: Abdomen non-distended. Normoactive. Soft, non-tender. No guarding.  Lymph/Hematologic: No abnormal or excessive bruising on exposed skin.  Skin: Warm, dry. No jaundice. No rashes on exposed skin.  Musculoskeletal: Moves all extremities freely.  Neurologic: No focal deficits.    Data   Data reviewed today: I reviewed all medications, new labs and imaging  results over the last 24 hours. I personally reviewed no images or EKG's today.    Recent Labs   Lab 07/30/22  1312   WBC 8.1   HGB 12.1   MCV 92         POTASSIUM 4.0   CHLORIDE 101   CO2 27   BUN 10.0   CR 0.77   ANIONGAP 8   DONIS 9.0   *     Recent Results (from the past 24 hour(s))   CT Orbits wo Contrast    Narrative    EXAM: CT ORBITAL W/O CONTRAST  7/30/2022 2:57 PM     HISTORY:  Question of right proptosis       COMPARISON: None available      TECHNIQUE: Using thin collimation multidetector helical acquisition  technique, axial, coronal, sagittal CT images were obtained through  the orbits and upper facial bones, following intravenous  administration of nonionic iodinated contrast medium.    CONTRAST: Isovue 370    FINDINGS: No preseptal, extraconal, or intraconal edema. No abnormal  enhancement. No intraorbital mass, hematoma or air. Intact globes  bilaterally. Mild right proptosis. Bilateral pseudophakia.     No orbital wall fracture. Mild mucosal thickening of bilateral  maxillary sinuses and ethmoid air cells, otherwise the visualized  portion of the paranasal sinuses and mastoid air cells are clear. .  Intact cribriform plate. Visualized dentition is within normal limits.    No focal abnormality of the visualized intracranial structures.      Impression    IMPRESSION: Right proptosis. Otherwise unremarkable exam.    I have personally reviewed the examination and initial interpretation  and I agree with the findings.    PRINCE BRUCE MD         SYSTEM ID:  [id number]

## 2022-07-31 NOTE — PROGRESS NOTES
Paged Dr. Sergey HOWARD on call as orders were received for blood cultures but zosyn has been given.

## 2022-07-31 NOTE — PLAN OF CARE
Admitted/transferred from: Emergency Department  2 RN full   skin assessment completed by La Abbasi, RN and Lilia RN.  Skin assessment finding: skin intact, no problems   Interventions/actions: other continue to monitor      Bedside Emergency Equipment Present:  Suction Regulator: Yes  Suction Canister: Yes  Tubing between Regulator and Canister: Yes  O2 Regulator with Tree: Yes  Ambu Bag: Yes     Activity: up ad javy  Neuros:alert and oriented x 4, swelling right eye  Cardiac:denies chest pain  Respiratory:room air  GI/:LBM 7/28, voiding  Diet:regular  Skin:clean, dry, intact  Lines/Drains:PIV saline locked  Plan: MRI - paperwork filled out and sent to MRI

## 2022-07-31 NOTE — ED PROVIDER NOTES
"ED Provider Note  [facility]      History     Chief Complaint   Patient presents with     Eye Problem     HPI  Alfreda Wing is a 67 year old female who is transferred from outside hospital for evaluation of right periorbital edema pain with extraocular movement and concern for proptosis.  The ophthalmology resident is aware the patient is coming.  Been no history of trauma no systemic illnesses she has no significant ocular history.  No recent illnesses she is COVID vaccinated.  Visual acuity is unaffected.  She has no thyroid disease.    Past Medical History  No past medical history on file.  No past surgical history on file.  alendronate (FOSAMAX) 70 MG tablet  Azelastine HCl 137 MCG/SPRAY SOLN  biotin 1000 MCG TABS tablet  fexofenadine (ALLEGRA) 180 MG tablet  traZODone (DESYREL) 100 MG tablet      Allergies   Allergen Reactions     Clarithromycin Hives     Doxycycline Hives     Sulfa Drugs Hives     Family History  No family history on file.  Social History       Past medical history, past surgical history, medications, allergies, family history, and social history were reviewed with the patient. No additional pertinent items.       Review of Systems   Constitutional: Negative for fever.   HENT: Positive for sinus pressure.    Eyes: Positive for pain and redness. Negative for photophobia and discharge.   Cardiovascular: Negative.    Genitourinary: Negative.    Neurological: Negative.    All other systems reviewed and are negative.    A complete review of systems was performed with pertinent positives and negatives noted in the HPI, and all other systems negative.    Physical Exam   BP: 135/66  Pulse: 100  Temp: 98.1  F (36.7  C)  Resp: 16  Height: 157.5 cm (5' 2\")  Weight: 59.4 kg (131 lb)  SpO2: 97 %  Physical Exam  Vitals and nursing note reviewed.   Constitutional:       General: She is not in acute distress.     Appearance: She is well-developed.   HENT:      Head: Normocephalic " and atraumatic.      Comments: Mild right periorbital edema the right eye does appear mildly proptotic EOMs are intact with conjugate gaze cornea is clear sclera mildly injected there is pain with EOM.  No facial pain or discoloration.     Mouth/Throat:      Mouth: Mucous membranes are moist.   Eyes:      General: No scleral icterus.     Conjunctiva/sclera: Conjunctivae normal.      Pupils: Pupils are equal, round, and reactive to light.   Cardiovascular:      Rate and Rhythm: Normal rate and regular rhythm.      Heart sounds: Normal heart sounds.   Pulmonary:      Effort: Pulmonary effort is normal. No respiratory distress.      Breath sounds: Normal breath sounds. No wheezing.   Abdominal:      General: Abdomen is flat.      Palpations: Abdomen is soft.   Musculoskeletal:      Cervical back: Neck supple.   Skin:     General: Skin is warm and dry.   Neurological:      General: No focal deficit present.      Mental Status: She is alert and oriented to person, place, and time.      Cranial Nerves: No cranial nerve deficit.   Psychiatric:         Mood and Affect: Mood normal.         Behavior: Behavior normal.         ED Course      Procedures       Seen by ophthalmology resident    Medications   piperacillin-tazobactam (ZOSYN) 4.5 g vial to attach to  mL bag (4.5 g Intravenous New Bag 7/31/22 1203)   lidocaine 1 % 0.1-1 mL (has no administration in time range)   lidocaine (LMX4) cream (has no administration in time range)   sodium chloride (PF) 0.9% PF flush 3 mL (3 mLs Intracatheter Given 7/31/22 0316)   sodium chloride (PF) 0.9% PF flush 3 mL (has no administration in time range)   senna-docusate (SENOKOT-S/PERICOLACE) 8.6-50 MG per tablet 1 tablet (has no administration in time range)     Or   senna-docusate (SENOKOT-S/PERICOLACE) 8.6-50 MG per tablet 2 tablet (has no administration in time range)   ondansetron (ZOFRAN ODT) ODT tab 4 mg (has no administration in time range)     Or   ondansetron (ZOFRAN)  injection 4 mg (has no administration in time range)   azelastine (ASTELIN) nasal spray 2 spray (2 sprays Both Nostrils Given 7/31/22 0737)   traZODone (DESYREL) tablet 100 mg (100 mg Oral Given 7/30/22 2203)   cetirizine (zyrTEC) tablet 5 mg (5 mg Oral Given 7/31/22 0737)   acetaminophen (TYLENOL) tablet 975 mg (has no administration in time range)   ibuprofen (ADVIL/MOTRIN) tablet 400 mg (400 mg Oral Given 7/31/22 1101)   oxyCODONE IR (ROXICODONE) half-tab 2.5-5 mg (has no administration in time range)   iopamidol (ISOVUE-370) solution 75 mL (75 mLs Intravenous Given 7/30/22 1449)   sodium chloride (PF) 0.9% PF flush 70 mL (70 mLs Intravenous Given 7/30/22 1449)   acetaminophen (TYLENOL) tablet 975 mg (975 mg Oral Given 7/30/22 1836)   morphine (PF) injection 4 mg (4 mg Intravenous Given 7/30/22 1837)            Results for orders placed or performed during the hospital encounter of 07/30/22   CT Orbits wo Contrast     Status: None    Narrative    EXAM: CT ORBITAL W/O CONTRAST  7/30/2022 2:57 PM     HISTORY:  Question of right proptosis       COMPARISON: None available      TECHNIQUE: Using thin collimation multidetector helical acquisition  technique, axial, coronal, sagittal CT images were obtained through  the orbits and upper facial bones, following intravenous  administration of nonionic iodinated contrast medium.    CONTRAST: Isovue 370    FINDINGS: No preseptal, extraconal, or intraconal edema. No abnormal  enhancement. No intraorbital mass, hematoma or air. Intact globes  bilaterally. Mild right proptosis. Bilateral pseudophakia.     No orbital wall fracture. Mild mucosal thickening of bilateral  maxillary sinuses and ethmoid air cells, otherwise the visualized  portion of the paranasal sinuses and mastoid air cells are clear. .  Intact cribriform plate. Visualized dentition is within normal limits.    No focal abnormality of the visualized intracranial structures.      Impression    IMPRESSION: Right  proptosis. Otherwise unremarkable exam.    I have personally reviewed the examination and initial interpretation  and I agree with the findings.    PRINCE BRUCE MD         SYSTEM ID:  [id number]   Basic metabolic panel     Status: Abnormal   Result Value Ref Range    Creatinine 0.77 0.51 - 0.95 mg/dL    Sodium 136 136 - 145 mmol/L    Potassium 4.0 3.4 - 5.3 mmol/L    Urea Nitrogen 10.0 8.0 - 23.0 mg/dL    Chloride 101 98 - 107 mmol/L    Carbon Dioxide (CO2) 27 22 - 29 mmol/L    Anion Gap 8 7 - 15 mmol/L    Glucose 128 (H) 70 - 99 mg/dL    GFR Estimate 84 >60 mL/min/1.73m2    Calcium 9.0 8.8 - 10.2 mg/dL   TSH with free T4 reflex     Status: Normal   Result Value Ref Range    TSH 1.71 0.30 - 4.20 uIU/mL   CBC with platelets and differential     Status: None   Result Value Ref Range    WBC Count 8.1 4.0 - 11.0 10e3/uL    RBC Count 3.93 3.80 - 5.20 10e6/uL    Hemoglobin 12.1 11.7 - 15.7 g/dL    Hematocrit 36.3 35.0 - 47.0 %    MCV 92 78 - 100 fL    MCH 30.8 26.5 - 33.0 pg    MCHC 33.3 31.5 - 36.5 g/dL    RDW 11.7 10.0 - 15.0 %    Platelet Count 353 150 - 450 10e3/uL    % Neutrophils 64 %    % Lymphocytes 23 %    % Monocytes 9 %    % Eosinophils 3 %    % Basophils 1 %    % Immature Granulocytes 0 %    NRBCs per 100 WBC 0 <1 /100    Absolute Neutrophils 5.3 1.6 - 8.3 10e3/uL    Absolute Lymphocytes 1.8 0.8 - 5.3 10e3/uL    Absolute Monocytes 0.7 0.0 - 1.3 10e3/uL    Absolute Eosinophils 0.3 0.0 - 0.7 10e3/uL    Absolute Basophils 0.1 0.0 - 0.2 10e3/uL    Absolute Immature Granulocytes 0.0 <=0.4 10e3/uL    Absolute NRBCs 0.0 10e3/uL   Asymptomatic COVID-19 Virus (Coronavirus) by PCR Nasopharyngeal     Status: Normal    Specimen: Nasopharyngeal; Swab   Result Value Ref Range    SARS CoV2 PCR Negative Negative, Testing sent to reference lab. Results will be returned via unsolicited result    Narrative    Testing was performed using the Xpert Xpress SARS-CoV-2 Assay on the  Cepheid Gene-Xpert Instrument Systems. Additional  information about  this Emergency Use Authorization (EUA) assay can be found via the Lab  Guide. This test should be ordered for the detection of SARS-CoV-2 in  individuals who meet SARS-CoV-2 clinical and/or epidemiological  criteria. Test performance is unknown in asymptomatic patients. This  test is for in vitro diagnostic use under the FDA EUA for  laboratories certified under CLIA to perform high complexity testing.  This test has not been FDA cleared or approved. A negative result  does not rule out the presence of PCR inhibitors in the specimen or  target RNA in concentration below the limit of detection for the  assay. The possibility of a false negative should be considered if  the patient's recent exposure or clinical presentation suggests  COVID-19. This test was validated by the [facility]. This laboratory is certified under  the Clinical Laboratory Improvement Amendments of 1988 (CLIA-88) as  qualified to perform high complexity laboratory testing.     CRP inflammation     Status: Abnormal   Result Value Ref Range    CRP Inflammation 26.80 (H) <5.00 mg/L   CBC with platelets     Status: Abnormal   Result Value Ref Range    WBC Count 7.5 4.0 - 11.0 10e3/uL    RBC Count 3.79 (L) 3.80 - 5.20 10e6/uL    Hemoglobin 11.4 (L) 11.7 - 15.7 g/dL    Hematocrit 35.3 35.0 - 47.0 %    MCV 93 78 - 100 fL    MCH 30.1 26.5 - 33.0 pg    MCHC 32.3 31.5 - 36.5 g/dL    RDW 11.8 10.0 - 15.0 %    Platelet Count 336 150 - 450 10e3/uL   Comprehensive metabolic panel     Status: Abnormal   Result Value Ref Range    Sodium 138 136 - 145 mmol/L    Potassium 3.8 3.4 - 5.3 mmol/L    Creatinine 0.75 0.51 - 0.95 mg/dL    Urea Nitrogen 8.0 8.0 - 23.0 mg/dL    Chloride 105 98 - 107 mmol/L    Carbon Dioxide (CO2) 25 22 - 29 mmol/L    Anion Gap 8 7 - 15 mmol/L    Glucose 96 70 - 99 mg/dL    Calcium 7.9 (L) 8.8 - 10.2 mg/dL    Protein Total 6.3 (L) 6.4 - 8.3 g/dL    Albumin 3.8 3.5 - 5.2  g/dL    Bilirubin Total 0.4 <=1.2 mg/dL    Alkaline Phosphatase 50 35 - 104 U/L    AST 21 10 - 35 U/L    ALT 19 10 - 35 U/L    GFR Estimate 86 >60 mL/min/1.73m2   Blood Culture Hand, Right     Status: Normal (Preliminary result)    Specimen: Hand, Right; Blood   Result Value Ref Range    Culture No growth after 12 hours    Blood Culture Arm, Left     Status: Normal (Preliminary result)    Specimen: Arm, Left; Blood   Result Value Ref Range    Culture No growth after 12 hours    CBC with platelets differential     Status: None    Narrative    The following orders were created for panel order CBC with platelets differential.  Procedure                               Abnormality         Status                     ---------                               -----------         ------                     CBC with platelets and d...[[id number]]                      Final result                 Please view results for these tests on the individual orders.     Medications   piperacillin-tazobactam (ZOSYN) 4.5 g vial to attach to  mL bag (4.5 g Intravenous New Bag 7/31/22 1203)   lidocaine 1 % 0.1-1 mL (has no administration in time range)   lidocaine (LMX4) cream (has no administration in time range)   sodium chloride (PF) 0.9% PF flush 3 mL (3 mLs Intracatheter Given 7/31/22 0316)   sodium chloride (PF) 0.9% PF flush 3 mL (has no administration in time range)   senna-docusate (SENOKOT-S/PERICOLACE) 8.6-50 MG per tablet 1 tablet (has no administration in time range)     Or   senna-docusate (SENOKOT-S/PERICOLACE) 8.6-50 MG per tablet 2 tablet (has no administration in time range)   ondansetron (ZOFRAN ODT) ODT tab 4 mg (has no administration in time range)     Or   ondansetron (ZOFRAN) injection 4 mg (has no administration in time range)   azelastine (ASTELIN) nasal spray 2 spray (2 sprays Both Nostrils Given 7/31/22 1934)   traZODone (DESYREL) tablet 100 mg (100 mg Oral Given 7/30/22 0468)   cetirizine (zyrTEC) tablet 5 mg  (5 mg Oral Given 7/31/22 0737)   acetaminophen (TYLENOL) tablet 975 mg (has no administration in time range)   ibuprofen (ADVIL/MOTRIN) tablet 400 mg (400 mg Oral Given 7/31/22 1101)   oxyCODONE IR (ROXICODONE) half-tab 2.5-5 mg (has no administration in time range)   iopamidol (ISOVUE-370) solution 75 mL (75 mLs Intravenous Given 7/30/22 1449)   sodium chloride (PF) 0.9% PF flush 70 mL (70 mLs Intravenous Given 7/30/22 1449)   acetaminophen (TYLENOL) tablet 975 mg (975 mg Oral Given 7/30/22 1836)   morphine (PF) injection 4 mg (4 mg Intravenous Given 7/30/22 1837)        Assessments & Plan (with Medical Decision Making)   67-year-old female with right eye pain mild proptosis and pain with EOM.  Ophthalmology is concerned about possible orbital cellulitis antibiotics were started patient will be admitted to the medicine service.  Labs are drawn and CT scan of the orbits has been performed.  Ophthalmology will assist in management on the medicine service.    I have reviewed the nursing notes. I have reviewed the findings, diagnosis, plan and need for follow up with the patient.    New Prescriptions    No medications on file       Final diagnoses:   Orbital cellulitis on right       --  Luc Connolly MD  [facility] EMERGENCY DEPARTMENT  7/30/2022     Luc Connolly MD  07/31/22 9874

## 2022-08-01 LAB
ALBUMIN SERPL BCG-MCNC: 4.3 G/DL (ref 3.5–5.2)
ALP SERPL-CCNC: 54 U/L (ref 35–104)
ALT SERPL W P-5'-P-CCNC: 18 U/L (ref 10–35)
ANION GAP SERPL CALCULATED.3IONS-SCNC: 8 MMOL/L (ref 7–15)
AST SERPL W P-5'-P-CCNC: 22 U/L (ref 10–35)
BILIRUB SERPL-MCNC: 0.4 MG/DL
BUN SERPL-MCNC: 9.6 MG/DL (ref 8–23)
CALCIUM SERPL-MCNC: 8.8 MG/DL (ref 8.8–10.2)
CHLORIDE SERPL-SCNC: 104 MMOL/L (ref 98–107)
CREAT SERPL-MCNC: 0.84 MG/DL (ref 0.51–0.95)
DEPRECATED HCO3 PLAS-SCNC: 28 MMOL/L (ref 22–29)
ERYTHROCYTE [DISTWIDTH] IN BLOOD BY AUTOMATED COUNT: 11.5 % (ref 10–15)
ERYTHROCYTE [SEDIMENTATION RATE] IN BLOOD BY WESTERGREN METHOD: 24 MM/HR (ref 0–30)
GFR SERPL CREATININE-BSD FRML MDRD: 75 ML/MIN/1.73M2
GLUCOSE SERPL-MCNC: 101 MG/DL (ref 70–99)
HCT VFR BLD AUTO: 37.4 % (ref 35–47)
HGB BLD-MCNC: 12.4 G/DL (ref 11.7–15.7)
LDH SERPL L TO P-CCNC: 132 U/L (ref 0–250)
MCH RBC QN AUTO: 30.4 PG (ref 26.5–33)
MCHC RBC AUTO-ENTMCNC: 33.2 G/DL (ref 31.5–36.5)
MCV RBC AUTO: 92 FL (ref 78–100)
PLATELET # BLD AUTO: 384 10E3/UL (ref 150–450)
POTASSIUM SERPL-SCNC: 3.7 MMOL/L (ref 3.4–5.3)
PROT SERPL-MCNC: 7.2 G/DL (ref 6.4–8.3)
RBC # BLD AUTO: 4.08 10E6/UL (ref 3.8–5.2)
SODIUM SERPL-SCNC: 140 MMOL/L (ref 136–145)
T PALLIDUM AB SER QL: NONREACTIVE
WBC # BLD AUTO: 7 10E3/UL (ref 4–11)

## 2022-08-01 PROCEDURE — 80053 COMPREHEN METABOLIC PANEL: CPT | Performed by: PHYSICIAN ASSISTANT

## 2022-08-01 PROCEDURE — 99221 1ST HOSP IP/OBS SF/LOW 40: CPT | Performed by: OPTOMETRIST

## 2022-08-01 PROCEDURE — 36415 COLL VENOUS BLD VENIPUNCTURE: CPT | Performed by: STUDENT IN AN ORGANIZED HEALTH CARE EDUCATION/TRAINING PROGRAM

## 2022-08-01 PROCEDURE — 85549 MURAMIDASE: CPT | Performed by: STUDENT IN AN ORGANIZED HEALTH CARE EDUCATION/TRAINING PROGRAM

## 2022-08-01 PROCEDURE — 82164 ANGIOTENSIN I ENZYME TEST: CPT | Performed by: STUDENT IN AN ORGANIZED HEALTH CARE EDUCATION/TRAINING PROGRAM

## 2022-08-01 PROCEDURE — 250N000013 HC RX MED GY IP 250 OP 250 PS 637: Performed by: PHYSICIAN ASSISTANT

## 2022-08-01 PROCEDURE — 86618 LYME DISEASE ANTIBODY: CPT | Performed by: STUDENT IN AN ORGANIZED HEALTH CARE EDUCATION/TRAINING PROGRAM

## 2022-08-01 PROCEDURE — 99232 SBSQ HOSP IP/OBS MODERATE 35: CPT | Performed by: STUDENT IN AN ORGANIZED HEALTH CARE EDUCATION/TRAINING PROGRAM

## 2022-08-01 PROCEDURE — 82784 ASSAY IGA/IGD/IGG/IGM EACH: CPT | Performed by: STUDENT IN AN ORGANIZED HEALTH CARE EDUCATION/TRAINING PROGRAM

## 2022-08-01 PROCEDURE — 85027 COMPLETE CBC AUTOMATED: CPT | Performed by: PHYSICIAN ASSISTANT

## 2022-08-01 PROCEDURE — 120N000002 HC R&B MED SURG/OB UMMC

## 2022-08-01 PROCEDURE — 86481 TB AG RESPONSE T-CELL SUSP: CPT | Performed by: STUDENT IN AN ORGANIZED HEALTH CARE EDUCATION/TRAINING PROGRAM

## 2022-08-01 PROCEDURE — 250N000013 HC RX MED GY IP 250 OP 250 PS 637: Performed by: STUDENT IN AN ORGANIZED HEALTH CARE EDUCATION/TRAINING PROGRAM

## 2022-08-01 PROCEDURE — 250N000011 HC RX IP 250 OP 636: Performed by: STUDENT IN AN ORGANIZED HEALTH CARE EDUCATION/TRAINING PROGRAM

## 2022-08-01 PROCEDURE — 83615 LACTATE (LD) (LDH) ENZYME: CPT | Performed by: STUDENT IN AN ORGANIZED HEALTH CARE EDUCATION/TRAINING PROGRAM

## 2022-08-01 PROCEDURE — 86780 TREPONEMA PALLIDUM: CPT | Performed by: STUDENT IN AN ORGANIZED HEALTH CARE EDUCATION/TRAINING PROGRAM

## 2022-08-01 PROCEDURE — 36415 COLL VENOUS BLD VENIPUNCTURE: CPT | Performed by: PHYSICIAN ASSISTANT

## 2022-08-01 PROCEDURE — 86036 ANCA SCREEN EACH ANTIBODY: CPT | Performed by: STUDENT IN AN ORGANIZED HEALTH CARE EDUCATION/TRAINING PROGRAM

## 2022-08-01 PROCEDURE — 86038 ANTINUCLEAR ANTIBODIES: CPT | Performed by: STUDENT IN AN ORGANIZED HEALTH CARE EDUCATION/TRAINING PROGRAM

## 2022-08-01 PROCEDURE — 250N000011 HC RX IP 250 OP 636: Performed by: EMERGENCY MEDICINE

## 2022-08-01 PROCEDURE — 85652 RBC SED RATE AUTOMATED: CPT | Performed by: STUDENT IN AN ORGANIZED HEALTH CARE EDUCATION/TRAINING PROGRAM

## 2022-08-01 RX ADMIN — PIPERACILLIN AND TAZOBACTAM 4.5 G: 4; .5 INJECTION, POWDER, FOR SOLUTION INTRAVENOUS at 11:14

## 2022-08-01 RX ADMIN — CETIRIZINE HYDROCHLORIDE 5 MG: 5 TABLET ORAL at 09:39

## 2022-08-01 RX ADMIN — ENOXAPARIN SODIUM 40 MG: 40 INJECTION SUBCUTANEOUS at 16:47

## 2022-08-01 RX ADMIN — ACETAMINOPHEN 975 MG: 325 TABLET, FILM COATED ORAL at 16:47

## 2022-08-01 RX ADMIN — PIPERACILLIN AND TAZOBACTAM 4.5 G: 4; .5 INJECTION, POWDER, FOR SOLUTION INTRAVENOUS at 18:56

## 2022-08-01 RX ADMIN — ACETAMINOPHEN 975 MG: 325 TABLET, FILM COATED ORAL at 22:29

## 2022-08-01 RX ADMIN — TRAZODONE HYDROCHLORIDE 100 MG: 100 TABLET ORAL at 22:29

## 2022-08-01 RX ADMIN — IBUPROFEN 400 MG: 200 TABLET, FILM COATED ORAL at 14:39

## 2022-08-01 RX ADMIN — PIPERACILLIN AND TAZOBACTAM 4.5 G: 4; .5 INJECTION, POWDER, FOR SOLUTION INTRAVENOUS at 03:44

## 2022-08-01 ASSESSMENT — ACTIVITIES OF DAILY LIVING (ADL)
ADLS_ACUITY_SCORE: 20

## 2022-08-01 NOTE — PLAN OF CARE
"Goal Outcome Evaluation:    Plan of Care Reviewed With: patient     Overall Patient Progress: no change    Admitting Diagnosis: Orbital cellulitis on R  Neuro: A&Ox4, denies numbness/tingling. PERRLA. Pt reports blurry and double vision if moving eyes laterally.   GI/: Voiding spontaneously. LBM 7/29, pt states that \"my normal is a bowel movement every 3-4 days\".   Resp: Sating adequately on RA.   VS: VSS  Skin: Swelling around R eye.   IV Access: PIV infusing TKO with antibiotics.   Labs: Reviewed  Pain: Pain managed with prn ibuprofen and scheduled tylenol. Pt reports pain with eye movement.   Diet: Regular  Activity: Up ad javy  Plan: Continue to monitor and follow plan of care.     "

## 2022-08-01 NOTE — PROVIDER NOTIFICATION
Purpose of Notification: Pt would like PTA dose of Fosamax ordered by tomorrow morning.   Notified Person: [employer]  Notification Time: 2130  Notification Interaction: Paged  Orders Obtained:  Comments: [employer] responded, would like to hold Fosamax during hospital stay.

## 2022-08-01 NOTE — PROGRESS NOTES
[facility]    Medicine Progress Note - Hospitalist Service, GOLD TEAM 8    Date of Admission:  7/30/2022    Assessment & Plan        Alfreda Wing is a 67 year old female admitted on 7/30/2022. She has a past medical history significant for osteoporosis, alcohol use, insomnia and seasonal allergies who presented to the ED with right eye periorbital edema and proptosis as well as binocular diplopia with clinical concerns for orbital cellulitis. Imaging concerning for a systemic inflammatory syndrome, though she did improve on antibiotics. Appreciate recommendations from ophthalmology and neurology.    Possible Orbital cellulitis of right eye vs systemic inflammatory syndrome  Right proptosis  Increased pain, swelling and redness of right in with proptosis in setting of allergies and sinus congestion. No fevers. CT orbits demonstrating right proptosis and mild mucosal thickening of bilateral maxillary sinuses and ethmoids. Ophthalmology consulted in ED who recommended treating for likely orbital cellulitis.   - Ophthalmology following:   -MR brain and orbit-MRV to evaluate cavernous sinus. No evidence of sinus venous thrombosis, but possible pachymeningeal enhancement concerning for polyneuropathy secondary to a systemic inflammatory process. Recommend neurology consult.  - Follow up labs ordered by ophthalmology: TB, lyme, LDH, ESR, IgG, ANCA, Lysozyme, ACE  - IV zosyn 4.5g q8h while awaiting further recommendations  - BCx2 (will be post-zosyn x1) NGTD  - Tylenol 975 mg q6h PRN, ibuprofen q6h PRN, oxycodone 5 mg q4h PRN for severe pain    Seasonal allergies  Have been worse this year. Seen by allergist and PCP in week leading up to admission who felt symptoms related to allergies vs sinus infection. Had been prescribed montelukast but insurance wouldn't cover.   - Continue PTA zyrtec    Alcohol use disorder, in early remission  Has struggled with alcohol use and working  with PCP for resources. Last drink on 7/25. Prior to this drinking 2-3 bottles of wine approximately 4 days per week. Low suspicion for withdrawal at this time.    Insomnia - Continue PTA trazodone 100 mg at bedtime  Osteoporosis - Takes fosamax on Mondays. Per patient, okay to hold vit D while admitted.       Diet: Regular Diet Adult    DVT Prophylaxis: Enoxaparin (Lovenox) SQ  Johnson Catheter: Not present  Central Lines: None  Cardiac Monitoring: None  Code Status: No CPR- Pre-arrest intubation OK      Disposition Plan      Expected Discharge Date: 08/03/2022        Discharge Comments: Right eye orbital swelling, started on IV Abx, optho following        The patient's care was discussed with the Patient and Ophthalmology Consultant.    Phuong Bonilla MD  Hospitalist Service, [address]  Securely message with the Vocera Web Console (learn more here)  Text page via [facility] Paging/Directory   Please see signed in provider for up to date coverage information      Clinically Significant Risk Factors Present on Admission                      ______________________________________________________________________    Interval History   - Feels better today but does have pain particularly with looking right. Eye is less swollen and needing less prn oxycodone. No changes to vision.     Data reviewed today: I reviewed all medications, new labs and imaging results over the last 24 hours. I personally reviewed no images or EKG's today.    Physical Exam   Vital Signs: Temp: (!) 96.1  F (35.6  C) Temp src: Oral BP: 129/58 Pulse: 72   Resp: 16 SpO2: 99 % O2 Device: None (Room air)    Weight: 131 lbs 0 oz  General Appearance: Alert, no distress. Up walking around room.  Eyes: PERRL, EOMI, periorbital edema of R eye with proptosis, redness of R conjunctival/sclera but improved from yesterday L eye normal.   Respiratory: CTAB  Cardiovascular: RRR, no murmus  GI: soft,  non-tender  Skin: warm and dry    Neuro: normal gait    Data   Recent Labs   Lab 08/01/22  0732 07/31/22  0550 07/30/22  1312   WBC 7.0 7.5 8.1   HGB 12.4 11.4* 12.1   MCV 92 93 92    336 353    138 136   POTASSIUM 3.7 3.8 4.0   CHLORIDE 104 105 101   CO2 28 25 27   BUN 9.6 8.0 10.0   CR 0.84 0.75 0.77   ANIONGAP 8 8 8   DONIS 8.8 7.9* 9.0   * 96 128*   ALBUMIN 4.3 3.8  --    PROTTOTAL 7.2 6.3*  --    BILITOTAL 0.4 0.4  --    ALKPHOS 54 50  --    ALT 18 19  --    AST 22 21  --

## 2022-08-01 NOTE — CONSULTS
OPHTHALMOLOGY CONSULT NOTE  08/01/22    Patient: Alfreda Wing      HISTORY OF PRESENTING ILLNESS:     Alfreda Wing is a 67 year old female who is admitted for right eye proptosis. Patient complains of sinus pressure and right eye pain since 2 weeks ago. Patient complains of right facial pain, diplopia, right eye pain, and right eye proptosis since 4 days ago 07/29. Patient is admitted for IV antibiotics on 07/30 and since the the eye pain has improved. Patient sees diplopia primarily on up gaze and to her right side.       10+ review of systems were otherwise negative except for that which has been stated above.  ASSESSMENT/PLAN:     #Proptosis, right eye.   #Orbital Inflammation, right eye > left eye  #Probable Multiple Cranial Nerve Palsies, right eye > left eye.  - Alfreda Wing is a 67 year old female who presents for management of right eye proptosis. Patient has concurrent right facial pain and right eye motility deficit. Vision is 20/20 in both eyes. There is no afferent pupillary defect. Motility deficit is primarily on abduction on the right eye with potential superior restriction on both eyes. MRV without evidence of venous sinus thrombosis. MRI 07/21 showed enhancement of retrobulbar fat, right eye > left eye. On our read, there is potentially pachymeningeal enhancement. The bilateral orbital orbital inflammation along with possible pachymeningeal enhancement which is concerning form polyneuropathy secondary to a systemic inflammatory process.     Plan:   - Recommend neurology consult  - Ophthalmology to follow        It is our pleasure to participate in this patient's care and treatment. Please contact us with any further questions or concerns.      Kaylie Sahni OD ([place])  Ophthalmology  Adjunct   Pager: [phone]    Discussed with Dr. Clarice Sánchez, oculoplastics fellow, and Dr. Craig Badillo, neuro-ophthalmology fellow.       OCULAR/MEDICAL/SURGICAL HISTORIES:     Past Ocular  History:  Proptosis, right eye.         No past medical history on file.    No past surgical history on file.    EXAMINATION:     Base Eye Exam     Visual Acuity       Right Left    Near cc 20/20 20/20          Visual Fields       Left Right     Full Full          Extraocular Movement       Right Left     -1 -tr -tr   -1  --   -1 -- --    -tr -tr -tr   --  --   -- -- --               Slit Lamp and Fundus Exam     External Exam       Right Left    External Proptosis Normal          Slit Lamp Exam       Right Left    Lids/Lashes Ptosis  Ptosis    Conjunctiva/Sclera 1+ Chemosis White and quiet    Cornea Clear Clear    Anterior Chamber Deep and quiet Deep and quiet    Iris Round and reactive Round and reactive    Lens PCIOL\superior PCIOL\

## 2022-08-02 ENCOUNTER — APPOINTMENT (OUTPATIENT)
Dept: CT IMAGING | Facility: CLINIC | Age: 67
DRG: 125 | End: 2022-08-02
Payer: COMMERCIAL

## 2022-08-02 LAB
ACE SERPL-CCNC: 27 U/L
ALBUMIN SERPL BCG-MCNC: 4 G/DL (ref 3.5–5.2)
ALP SERPL-CCNC: 48 U/L (ref 35–104)
ALT SERPL W P-5'-P-CCNC: 21 U/L (ref 10–35)
ANA SER QL IF: NEGATIVE
ANCA AB PATTERN SER IF-IMP: NORMAL
ANION GAP SERPL CALCULATED.3IONS-SCNC: 10 MMOL/L (ref 7–15)
AST SERPL W P-5'-P-CCNC: 26 U/L (ref 10–35)
B BURGDOR IGG+IGM SER QL: 0.06
BILIRUB SERPL-MCNC: 0.4 MG/DL
BUN SERPL-MCNC: 10.2 MG/DL (ref 8–23)
C-ANCA TITR SER IF: NORMAL {TITER}
CALCIUM SERPL-MCNC: 8.7 MG/DL (ref 8.8–10.2)
CHLORIDE SERPL-SCNC: 105 MMOL/L (ref 98–107)
CREAT SERPL-MCNC: 0.83 MG/DL (ref 0.51–0.95)
DEPRECATED HCO3 PLAS-SCNC: 24 MMOL/L (ref 22–29)
ERYTHROCYTE [DISTWIDTH] IN BLOOD BY AUTOMATED COUNT: 11.7 % (ref 10–15)
GAMMA INTERFERON BACKGROUND BLD IA-ACNC: 0.03 IU/ML
GFR SERPL CREATININE-BSD FRML MDRD: 76 ML/MIN/1.73M2
GLUCOSE SERPL-MCNC: 84 MG/DL (ref 70–99)
HCT VFR BLD AUTO: 36.2 % (ref 35–47)
HGB BLD-MCNC: 12.2 G/DL (ref 11.7–15.7)
HOLD SPECIMEN: NORMAL
IGG SERPL-MCNC: 1020 MG/DL (ref 610–1616)
IGG1 SER-MCNC: 448 MG/DL (ref 382–929)
IGG2 SER-MCNC: 429 MG/DL (ref 242–700)
IGG3 SER-MCNC: 41 MG/DL (ref 22–176)
IGG4 SER-MCNC: 34 MG/DL (ref 4–86)
LYSOZYME SERPL-MCNC: 1.31 UG/ML
M TB IFN-G BLD-IMP: NEGATIVE
M TB IFN-G CD4+ BCKGRND COR BLD-ACNC: 9.97 IU/ML
MCH RBC QN AUTO: 30.7 PG (ref 26.5–33)
MCHC RBC AUTO-ENTMCNC: 33.7 G/DL (ref 31.5–36.5)
MCV RBC AUTO: 91 FL (ref 78–100)
MITOGEN IGNF BCKGRD COR BLD-ACNC: 0.01 IU/ML
MITOGEN IGNF BCKGRD COR BLD-ACNC: 0.01 IU/ML
PLATELET # BLD AUTO: 384 10E3/UL (ref 150–450)
POTASSIUM SERPL-SCNC: 4 MMOL/L (ref 3.4–5.3)
PROT SERPL-MCNC: 6.7 G/DL (ref 6.4–8.3)
QUANTIFERON MITOGEN: 10 IU/ML
QUANTIFERON NIL TUBE: 0.03 IU/ML
QUANTIFERON TB1 TUBE: 0.04 IU/ML
QUANTIFERON TB2 TUBE: 0.04
RBC # BLD AUTO: 3.98 10E6/UL (ref 3.8–5.2)
SODIUM SERPL-SCNC: 139 MMOL/L (ref 136–145)
SUBCLASSES, PERCENT: 93 %
WBC # BLD AUTO: 7.9 10E3/UL (ref 4–11)

## 2022-08-02 PROCEDURE — 250N000011 HC RX IP 250 OP 636: Performed by: EMERGENCY MEDICINE

## 2022-08-02 PROCEDURE — 258N000003 HC RX IP 258 OP 636: Performed by: STUDENT IN AN ORGANIZED HEALTH CARE EDUCATION/TRAINING PROGRAM

## 2022-08-02 PROCEDURE — 84445 ASSAY OF TSI GLOBULIN: CPT | Performed by: STUDENT IN AN ORGANIZED HEALTH CARE EDUCATION/TRAINING PROGRAM

## 2022-08-02 PROCEDURE — 99232 SBSQ HOSP IP/OBS MODERATE 35: CPT | Performed by: STUDENT IN AN ORGANIZED HEALTH CARE EDUCATION/TRAINING PROGRAM

## 2022-08-02 PROCEDURE — 250N000011 HC RX IP 250 OP 636: Performed by: STUDENT IN AN ORGANIZED HEALTH CARE EDUCATION/TRAINING PROGRAM

## 2022-08-02 PROCEDURE — 71260 CT THORAX DX C+: CPT | Mod: 26 | Performed by: RADIOLOGY

## 2022-08-02 PROCEDURE — 85027 COMPLETE CBC AUTOMATED: CPT | Performed by: PHYSICIAN ASSISTANT

## 2022-08-02 PROCEDURE — 84999 UNLISTED CHEMISTRY PROCEDURE: CPT | Performed by: STUDENT IN AN ORGANIZED HEALTH CARE EDUCATION/TRAINING PROGRAM

## 2022-08-02 PROCEDURE — 999N000127 HC STATISTIC PERIPHERAL IV START W US GUIDANCE

## 2022-08-02 PROCEDURE — 86053 AQAPRN-4 ANTB FLO CYTMTRY EA: CPT | Performed by: STUDENT IN AN ORGANIZED HEALTH CARE EDUCATION/TRAINING PROGRAM

## 2022-08-02 PROCEDURE — 250N000013 HC RX MED GY IP 250 OP 250 PS 637: Performed by: PHYSICIAN ASSISTANT

## 2022-08-02 PROCEDURE — 71260 CT THORAX DX C+: CPT

## 2022-08-02 PROCEDURE — 36415 COLL VENOUS BLD VENIPUNCTURE: CPT | Performed by: PHYSICIAN ASSISTANT

## 2022-08-02 PROCEDURE — 999N000128 HC STATISTIC PERIPHERAL IV START W/O US GUIDANCE

## 2022-08-02 PROCEDURE — 80053 COMPREHEN METABOLIC PANEL: CPT | Performed by: PHYSICIAN ASSISTANT

## 2022-08-02 PROCEDURE — 250N000013 HC RX MED GY IP 250 OP 250 PS 637: Performed by: STUDENT IN AN ORGANIZED HEALTH CARE EDUCATION/TRAINING PROGRAM

## 2022-08-02 PROCEDURE — 99207 PR SERVICE NOT STAFFED W/SUPERV PROV: CPT | Performed by: OPHTHALMOLOGY

## 2022-08-02 PROCEDURE — 36415 COLL VENOUS BLD VENIPUNCTURE: CPT | Performed by: STUDENT IN AN ORGANIZED HEALTH CARE EDUCATION/TRAINING PROGRAM

## 2022-08-02 PROCEDURE — 86363 MOG-IGG1 ANTB FLO CYTMTRY EA: CPT | Performed by: STUDENT IN AN ORGANIZED HEALTH CARE EDUCATION/TRAINING PROGRAM

## 2022-08-02 PROCEDURE — 120N000002 HC R&B MED SURG/OB UMMC

## 2022-08-02 PROCEDURE — 99222 1ST HOSP IP/OBS MODERATE 55: CPT | Performed by: PSYCHIATRY & NEUROLOGY

## 2022-08-02 RX ORDER — IOPAMIDOL 755 MG/ML
64 INJECTION, SOLUTION INTRAVASCULAR ONCE
Status: COMPLETED | OUTPATIENT
Start: 2022-08-02 | End: 2022-08-02

## 2022-08-02 RX ADMIN — PIPERACILLIN AND TAZOBACTAM 4.5 G: 4; .5 INJECTION, POWDER, FOR SOLUTION INTRAVENOUS at 21:13

## 2022-08-02 RX ADMIN — ACETAMINOPHEN 975 MG: 325 TABLET, FILM COATED ORAL at 04:36

## 2022-08-02 RX ADMIN — ACETAMINOPHEN 975 MG: 325 TABLET, FILM COATED ORAL at 22:00

## 2022-08-02 RX ADMIN — ACETAMINOPHEN 975 MG: 325 TABLET, FILM COATED ORAL at 11:01

## 2022-08-02 RX ADMIN — IBUPROFEN 400 MG: 200 TABLET, FILM COATED ORAL at 08:26

## 2022-08-02 RX ADMIN — PIPERACILLIN AND TAZOBACTAM 4.5 G: 4; .5 INJECTION, POWDER, FOR SOLUTION INTRAVENOUS at 04:36

## 2022-08-02 RX ADMIN — IOPAMIDOL 64 ML: 755 INJECTION, SOLUTION INTRAVENOUS at 15:39

## 2022-08-02 RX ADMIN — ACETAMINOPHEN 975 MG: 325 TABLET, FILM COATED ORAL at 16:57

## 2022-08-02 RX ADMIN — SODIUM CHLORIDE 1000 MG: 9 INJECTION, SOLUTION INTRAVENOUS at 16:52

## 2022-08-02 RX ADMIN — TRAZODONE HYDROCHLORIDE 100 MG: 100 TABLET ORAL at 21:53

## 2022-08-02 RX ADMIN — PIPERACILLIN AND TAZOBACTAM 4.5 G: 4; .5 INJECTION, POWDER, FOR SOLUTION INTRAVENOUS at 12:13

## 2022-08-02 RX ADMIN — CETIRIZINE HYDROCHLORIDE 5 MG: 5 TABLET ORAL at 08:19

## 2022-08-02 ASSESSMENT — ACTIVITIES OF DAILY LIVING (ADL)
ADLS_ACUITY_SCORE: 20

## 2022-08-02 NOTE — PLAN OF CARE
Care Provided:     Temp: (!) 96.6  F (35.9  C) Temp src: Oral BP: (!) 142/54 Pulse: 67   Resp: 18 SpO2: (!) 59 % O2 Device: None (Room air)      Neuro: A&Ox4.   Cardiac: WDL  Respiratory: Sating high 90's on RA.  GI: Last BM 7/29. States this is normal for her. Tolerating regular diet.  : Adequate urine output. Continent and voiding independently.   Activity: Up at javy.   Pain: At acceptable level on current regimen. Given PRN ibuprofen and scheduled tylenol.   Skin: No new deficits noted.    LDAs: Right anterior upper forearm PIV tko.     Shift Events: PIV removed and new PIV inserted by IV team.     Plan: Will continue to monitor pt closely and notify primary team with any changes.

## 2022-08-02 NOTE — PROGRESS NOTES
"BP (!) 152/58 (BP Location: Right arm)   Pulse 66   Temp 96.8  F (36  C) (Oral)   Resp 18   Ht 1.575 m (5' 2\")   Wt 59.4 kg (131 lb)   SpO2 95%   BMI 23.96 kg/m      Status: Alfreda is a 66 yo pt admitted 07/30/22 with orbital cellulitis of the right eye with No other medical hx.    Activity: Up ad javy  Neuros:  A&O x4.   Cardiac:  WDL.   Respiratory:  WDL on RA. Denies SOB.  GI/:  Voiding spontaneously. +BS, passing flatus, LBM 07/29/22  Diet: Regular  Skin: right orbital swelling  Lines: R PIV for intermittent IV meds admin.   Incisions/Drains: None  Labs: Reviewed  Pain/nausea: Right eye pain, tylenol for pain  New changes this shift: None  Plan:  Continue to monitor      "

## 2022-08-02 NOTE — CONSULTS
[facility]  Neurology Consultation    Patient Name:  Alfreda Wing  MRN:  [id number]    :  1955  Date of Service:  2022  Primary care provider:  Gretchen Núñez      Neurology consultation service was asked to see Aflreda Wing by Dr. Michaelle Figueroa to evaluate leptomeningeal enhancement and ophthalmology recommendation to evaluate for inflammatory process.    Chief Complaint: R periorbital edema and pain    History of Present Illness:   Alfreda Wing is a 67 year old female with past medical history of osteoporosis, alcohol use and seasonal allergies who presented to the ED with R eye periorbital edema and pain, R visual field diplopia, and proptosis. Patient was given Zosyn to treat suspected orbital cellulitis.    A couple weeks prior, patient noted increased sinus and ear pressure and a notably worse year of seasonal allergies. Patient plans to follow up with allergy.    Today, patient says the swelling is decreased and there is still slight discomfort around the R eye. Endorses mucus accumulation and worsened swelling in the morning and lacrimation with extraocular movement in R eye. Experiences postnasal drip and coughing which is consistent with past weeks. Denies diffuse headache, concentration or memory concerns and dizziness.    ROS  A comprehensive ROS was performed and pertinent findings were included in HPI.     PMH  No past medical history on file.   No past surgical history on file.    Medications   I have personally reviewed the patient's medication list.     Allergies  I have personally reviewed the patient's allergy list.     Social History  Alcohol Use Standard Drinks/Week Comments   Yes 0 (1 standard drink = 0.6 oz pure alcohol) 3 glass of wine per day   Denies tobacco and recreational drug use.    Physical Examination   Vitals: BP (!) 142/54 (BP Location: Right arm)   Pulse 67   Temp (!) 96.6  F (35.9  C) (Oral)   Resp 18   Ht 1.575 m (5'  "2\")   Wt 59.4 kg (131 lb)   SpO2 (!) 59%   BMI 23.96 kg/m    General: patient lying in bed without any acute distress    HEENT: normocephalic/atraumatic  Cardio: RRR  Pulmonary: no respiratory distress  Extremities: no edema  Skin: intact, warm/dry   Neuro:  Mental status: Awake and alert. Follows commands and produces clear, spontaneous speech.   Cranial nerves: PERRL and EOMI except abduction of R eye is restricted. Diplopia present with right gaze. Face symmetric. Facial sensation intact and shoulder shrug strong. Tongue protrusion is midline. Hearing not formally tested but intact to conversation.  Motor: Normal bulk and tone. 5/5 in all extremities.  Reflexes: Symmetric, 1+ reflexes in biceps, brachioradialis, triceps, and patellae.  Sensory: Intact to light touch in all 4 extremities.  Coordination: FNF w/o ataxia or dysmetria.    Gait: Deferred.    Investigations   Was patient transferred from outside hospital?   Yes - I have personally reviewed pertinent notes, labs, and images (if available) from outside hospital.    MRV Brain wwo Contrast 7/31/22  IMPRESSION:  1. Abnormal T2 and enhancing signal in the retrobulbar fat and right proptosis. There is also more mild abnormal signal and enhancement in the left orbital fat. Although there is mild ethmoid sinusitis, it seems unlikely that there would be bilateral orbital cellulitis. An inflammatory process such as sarcoidosis could have this appearance or potentially bilateral orbital pseudotumor.   2. No evidence for thrombus or mass involving the cavernous sinuses.    CT Orbits w/o Contrast 7/30/22  IMPRESSION:  1. Right proptosis. Otherwise unremarkable exam.      A&P:  Alfreda Wing is a 67 year old female with past medical history of seasonal allergies who presented to the ED with R eye periorbital edema and pain and proptosis. MRV revealed enhancement of b/l retrobulbar fat consistent with bilateral orbital cellulitis. Neurology service was consulted " regarding possible meningeal enhancement on MRI.  On exam there is restricted right eye abduction which can be consitent with intraocular inflammation. Otherwise Cranial nerve 3 is grossly intact. No there cranial nerve involvement was appreciated on exam. Upon review of imaging and speaking with radiology staff, leptomeningeal or pachymenigial enhancement is not suspected on imaging. Patients presentation and exam is most consistent with focal inflammation 2/2 cellulitis.     Recommendations  - No further neurologic work up indicated  - Neurology will sign off      Patient was seen and discussed with Dr. Muller.    Margarette Horne, MS3

## 2022-08-02 NOTE — PLAN OF CARE
Vital signs:  Temp: 96.9  F (36.1  C) Temp src: Oral BP: (Abnormal) 145/60 Pulse: 67   Resp: 16 SpO2: 97 % O2 Device: None (Room air)    Activity: Up independently.   Neuros: A&O x4. Continues to have periorbital edema to right eye.  Cardiac: WDL.   Respiratory: WDL on RA. Denies SOB.  GI/: Voiding spontaneously. +BS, passing flatus.   Diet: Regular.   Lines: Right PIV SL in between IV ABX.   Pain/nausea: Denies.   Patient BIBA, abscess to right upper back for past couple weeks, had skin graft in [facility]. As per EMS HHA was at house today and noticed abscess leaking, was suppose to have sx today.

## 2022-08-02 NOTE — PLAN OF CARE
"  Problem: Plan of Care - These are the overarching goals to be used throughout the patient stay.    Goal: Optimal Comfort and Wellbeing  Outcome: Ongoing, Progressing     Problem: Pain Acute  Goal: Acceptable Pain Control and Functional Ability  Outcome: Ongoing, Progressing  Intervention: Prevent or Manage Pain  Recent Flowsheet Documentation  Medication Review/Management: medications reviewed     Pt is alert and oriented X 4. Was given tylenol 975 mg PO for pain. Call light appropriate. BP (!) (P) 146/66 (BP Location: Right arm)   Pulse (P) 66   Temp (!) (P) 96  F (35.6  C) (Oral)   Resp (P) 16   Ht 1.575 m (5' 2\")   Wt 59.4 kg (131 lb)   SpO2 (P) 96%. On RA. Independent with activities. IV antibiotics given. Will monitor.   "

## 2022-08-02 NOTE — CONSULTS
OPHTHALMOLOGY CONSULT NOTE  08/01/22    Patient: Alfreda Wing    Interval Summary: Feeling better today. Eye swelling decreased. Still having pain with eye movements but is much improved from prior. Discussed results of lab workup thus far and adding on a few additional labs and possible imaging.     HISTORY OF PRESENTING ILLNESS:     Alfreda Wing is a 67 year old female who is admitted for right eye proptosis. Patient complains of sinus pressure and right eye pain since 2 weeks ago. Patient complains of right facial pain, diplopia, right eye pain, and right eye proptosis since 4 days ago 07/29. Patient is admitted for IV antibiotics on 07/30.      10+ review of systems were otherwise negative except for that which has been stated above.  ASSESSMENT/PLAN:     #Proptosis, right eye.   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  - Alfreda Wing is a 67 year old female who presents for management of right eye proptosis. Patient has concurrent right facial pain and right eye motility deficit. Vision is 20/20 in both eyes. There is no afferent pupillary defect. Motility deficit is primarily on abduction on the right eye with potential superior restriction on both eyes. MRV without evidence of venous sinus thrombosis. MRI 07/21 showed enhancement of retrobulbar fat, left > right eye despite the fact that external symptoms including proptosis and periorbital edema and erythema worse on the right. On our read, there is questionable pachymeningeal enhancement. The bilateral orbital orbital inflammation along with possible pachymeningeal enhancement is concerning form polyneuropathy secondary to a systemic inflammatory process.    Plan:   - Recommend starting IV methylprednisolone 1g daily  - Continue IV antibiotics for at least 24 hours after starting IV steroids (can reassess tomorrow)  - CT chest to evaluate for sarcoidosis  - Check TSI, CNS demyelinating panel   -  Continue to hold Alendronate    - Ophthalmology to follow      It is our pleasure to participate in this patient's care and treatment. Please contact us with any further questions or concerns.      Luzmaria Park MD  Resident Physician, PGY-2  Department of Ophthalmology      Discussed with Dr. Clarice Sánchez, oculoplastics fellow, and Dr. Craig Badillo, neuro-ophthalmology fellow.     EXAMINATION:     Base Eye Exam     Visual Acuity       Right Left    Near cc 20/20 20/20-2          Tonometry (Tonopen, 1:43 PM)       Right Left    Pressure 18 15          Pupils       Pupils APD    Right PERRL None    Left PERRL None          Visual Fields       Left Right     Full Full          Extraocular Movement       Right Left     Ortho Ortho     - trace 0  0    - trace  0    - trace 0  0     0 0 0   0  0   0 0 0               Slit Lamp and Fundus Exam     External Exam       Right Left    External Tr Proptosis Normal          Slit Lamp Exam       Right Left    Lids/Lashes Tr Ptosis  Ptosis    Conjunctiva/Sclera White and quiet White and quiet    Cornea Clear Clear    Anterior Chamber Deep and quiet Deep and quiet    Iris Round and reactive Round and reactive    Lens PCIOL, PCO inferonasal and superior PCIOL

## 2022-08-02 NOTE — PROGRESS NOTES
Admitting Diagnosis: Orbital cellulitis on R  Neuro: A&Ox4, denies numbness/tingling. PERRLA.   GI/: Voiding spontaneously. LBM 7/29.  Resp: On RA, sating adequately.  VS: VSS  Skin: Swelling around R eye.   IV Access: PIV infusing TKO with antibiotics.   Labs: Reviewed  Pain: Pain managed with prn ibuprofen and scheduled tylenol. Pt reports pain with eye movement.   Diet: Regular  Activity: Up ad javy  Plan: Continue to monitor and follow plan of care.

## 2022-08-03 LAB
Lab: NORMAL
PERFORMING LABORATORY: NORMAL
SPECIMEN STATUS: NORMAL
TEST NAME: NORMAL

## 2022-08-03 PROCEDURE — 250N000013 HC RX MED GY IP 250 OP 250 PS 637: Performed by: PHYSICIAN ASSISTANT

## 2022-08-03 PROCEDURE — 87385 HISTOPLASMA CAPSUL AG IA: CPT | Performed by: STUDENT IN AN ORGANIZED HEALTH CARE EDUCATION/TRAINING PROGRAM

## 2022-08-03 PROCEDURE — 120N000002 HC R&B MED SURG/OB UMMC

## 2022-08-03 PROCEDURE — 250N000011 HC RX IP 250 OP 636: Performed by: EMERGENCY MEDICINE

## 2022-08-03 PROCEDURE — 250N000011 HC RX IP 250 OP 636: Performed by: STUDENT IN AN ORGANIZED HEALTH CARE EDUCATION/TRAINING PROGRAM

## 2022-08-03 PROCEDURE — 250N000013 HC RX MED GY IP 250 OP 250 PS 637: Performed by: STUDENT IN AN ORGANIZED HEALTH CARE EDUCATION/TRAINING PROGRAM

## 2022-08-03 PROCEDURE — 258N000003 HC RX IP 258 OP 636: Performed by: STUDENT IN AN ORGANIZED HEALTH CARE EDUCATION/TRAINING PROGRAM

## 2022-08-03 PROCEDURE — 99232 SBSQ HOSP IP/OBS MODERATE 35: CPT | Performed by: STUDENT IN AN ORGANIZED HEALTH CARE EDUCATION/TRAINING PROGRAM

## 2022-08-03 RX ADMIN — SODIUM CHLORIDE 1000 MG: 9 INJECTION, SOLUTION INTRAVENOUS at 16:10

## 2022-08-03 RX ADMIN — ACETAMINOPHEN 975 MG: 325 TABLET, FILM COATED ORAL at 10:33

## 2022-08-03 RX ADMIN — CETIRIZINE HYDROCHLORIDE 5 MG: 5 TABLET ORAL at 08:28

## 2022-08-03 RX ADMIN — ACETAMINOPHEN 975 MG: 325 TABLET, FILM COATED ORAL at 16:46

## 2022-08-03 RX ADMIN — PIPERACILLIN AND TAZOBACTAM 4.5 G: 4; .5 INJECTION, POWDER, FOR SOLUTION INTRAVENOUS at 04:13

## 2022-08-03 RX ADMIN — TRAZODONE HYDROCHLORIDE 100 MG: 100 TABLET ORAL at 22:39

## 2022-08-03 RX ADMIN — ACETAMINOPHEN 975 MG: 325 TABLET, FILM COATED ORAL at 04:13

## 2022-08-03 RX ADMIN — ACETAMINOPHEN 975 MG: 325 TABLET, FILM COATED ORAL at 22:38

## 2022-08-03 RX ADMIN — PIPERACILLIN AND TAZOBACTAM 4.5 G: 4; .5 INJECTION, POWDER, FOR SOLUTION INTRAVENOUS at 12:16

## 2022-08-03 ASSESSMENT — ACTIVITIES OF DAILY LIVING (ADL)
ADLS_ACUITY_SCORE: 20

## 2022-08-03 NOTE — PROGRESS NOTES
[facility]    Medicine Progress Note - Hospitalist Service, GOLD TEAM 8    Date of Admission:  7/30/2022    Assessment & Plan                   Alfreda Wing is a 67 year old female admitted on 7/30/2022. She has a past medical history significant for osteoporosis, alcohol use, insomnia and seasonal allergies who presented to the ED with right eye periorbital edema and proptosis as well as binocular diplopia with clinical concerns for orbital cellulitis. Imaging concerning for a systemic inflammatory syndrome, though she did improve on antibiotics. Appreciate recommendations from ophthalmology and neurology.    Possible Orbital cellulitis of right eye vs systemic inflammatory syndrome  Right proptosis  Increased pain, swelling and redness of right in with proptosis in setting of allergies and sinus congestion. No fevers. CT orbits demonstrating right proptosis and mild mucosal thickening of bilateral maxillary sinuses and ethmoids. Ophthalmology consulted in ED who recommended treating for likely orbital cellulitis.   - Ophthalmology following:   -MR brain and orbit-MRV to evaluate cavernous sinus. No evidence of sinus venous thrombosis, but possible pachymeningeal enhancement concerning for polyneuropathy secondary to a systemic inflammatory process.    - ?Possible connection between symptoms and rare reaction to bisphosphonate therapy (recently initiated) of orbital inflammation. Hold bisphosphonate for now.   - Neurology consulted, appreciate recs   - Follow up labs ordered by ophthalmology: TB, lyme, LDH, ESR, IgG, ANCA, Lysozyme, ACE  - Stop IV Zosyn 8/3/22   - Start 1g methylpred daily per Ophthalmology and assess response; improving with steroids   - BCx2 (will be post-zosyn x1) NGTD  - Tylenol 975 mg q6h PRN, ibuprofen q6h PRN, oxycodone 5 mg q4h PRN for severe pain    Nodular areas of mucous plugging on Chest CT  Scattered sub 5mm pulmonary nodules on Chest  CT  Chest CT obtained to evaluate for any signs of pulmonary sarcoidosis given findings of orbital inflammation and concern for underlying systemic disease. No signs of sarcoid on CT, however read did note areas of mucous plugging  With tree-in-bud opacities, suggestive of histoplasmosis. Patient does have a chronic cough for the last year, however it is most likely associated with seasonal allergies as the cough is triggered by her known allergens. No SOB, fevers, athralgia, bloody sputum. However given the increased risk now that she is starting high dose steroids, will check urine histoplasma antigen. She is a never smoker however had significant child and young adult bravo second hand smoke exposure. Plan to repeat Chest CT in 12 months for interval follow up.     Seasonal allergies  Have been worse this year. Seen by allergist and PCP in week leading up to admission who felt symptoms related to allergies vs sinus infection. Had been prescribed montelukast but insurance wouldn't cover.   - Continue PTA zyrtec    Alcohol use disorder, in early remission  Has struggled with alcohol use and working with PCP for resources. Last drink on 7/25. Prior to this drinking 2-3 bottles of wine approximately 4 days per week. No withdrawal symptoms.     Insomnia - Continue PTA trazodone 100 mg at bedtime  Osteoporosis - Hold bisphosphonate as above          Diet: Regular Diet Adult    DVT Prophylaxis: Enoxaparin (Lovenox) SQ  Johnson Catheter: Not present  Central Lines: None  Cardiac Monitoring: None  Code Status: No CPR- Pre-arrest intubation OK      Disposition Plan      Expected Discharge Date: 08/05/2022        Discharge Comments: Right eye orbital swelling, optho following, starting IV steroids        The patient's care was discussed with the Patient and Patient's Family.    Ruth Sewell MD  Hospitalist Service, GOLD TEAM [address] [place] [address]  Securely message with the Vocera Web  Console (learn more here)  Text page via [facility] Paging/Directory   Please see signed in provider for up to date coverage information      Clinically Significant Risk Factors Present on Admission                     ______________________________________________________________________    Interval History   No events overnight  Feeling much better today  Eye pain almost completely resolved  No double vision with peripheral vision   No fevers, chills, nausea, or vomiting     4 point ROS otherwise negative     Data reviewed today: I reviewed all medications, new labs and imaging results over the last 24 hours. I personally reviewed no images or EKG's today.    Physical Exam   Vital Signs: Temp: (!) 96  F (35.6  C) Temp src: Oral BP: (!) 140/61 Pulse: 73   Resp: 16 SpO2: 97 % O2 Device: None (Room air)    Weight: 131 lbs 0 oz    General: Awake, alert, no distress. Pleasant and conversant  Eyes: R eye proptosis and periorbital edema. Mild conjunctival injection. Swelling and redness much improved today.   Mouth: Oropharynx benign, no tonsillar exudate. MMM.   Neck: Supple, full ROM, no adenopathy.  CV: Normal rate and rhythm, normal S1 and S2. No murmurs, rubs, gallops. Radial pulses 2+ and symmetric.  Respiratory: Lungs clear to auscultation bilaterally. No wheezing, rhonchi, or rales.  Abdomen: Soft, non-distended. Non-tender to palpation. No rebound tenderness or guarding. +BS.   MSK: No joint redness, deformity or swelling.   Extremities: No lower extremity edema.   Skin: Warm, dry. No rash on visible skin.   Neuro: Alert, oriented to conversation and situation. Face symmetric, speech intact. Moves all extremities.   Psych: Normal affect.       Data   Recent Labs   Lab 08/02/22  0644 08/01/22  0732 07/31/22  0550   WBC 7.9 7.0 7.5   HGB 12.2 12.4 11.4*   MCV 91 92 93    384 336    140 138   POTASSIUM 4.0 3.7 3.8   CHLORIDE 105 104 105   CO2 24 28 25   BUN 10.2 9.6 8.0   CR 0.83 0.84 0.75   ANIONGAP 10 8 8    DONIS 8.7* 8.8 7.9*   GLC 84 101* 96   ALBUMIN 4.0 4.3 3.8   PROTTOTAL 6.7 7.2 6.3*   BILITOTAL 0.4 0.4 0.4   ALKPHOS 48 54 50   ALT 21 18 19   AST 26 22 21     Recent Results (from the past 24 hour(s))   CT Chest w Contrast    Narrative    EXAMINATION: CT CHEST W CONTRAST, 8/2/2022 3:52 PM    TECHNIQUE:  Helical CT images from the thoracic inlet through the lung  bases were obtained without IV contrast.     COMPARISON: None.    HISTORY: Patient with bilateral orbital inflammation on MR. Eval for  sarcoidosis    FINDINGS:    Chest:   Mediastinum: Unremarkable thyroid. Patent tracheobronchial tree.  Patulous esophagus. Small hiatal hernia.  The pleura appears  unremarkable. No pneumothorax. Normal heart size. No significant  pericardial effusion. Thoracic aorta caliber within normal limits.  Main pulmonary artery caliber within normal limits.Aortic  atherosclerosis. Calcified left hilar lymph nodes.    Lungs:    Dependent atelectasis. No focal consolidative opacities..     Right middle lobe anterolateral peripheral pulmonary nodule measuring  0.4 x 0.3 cm which may be mucous plugging. (Series 6, image 150).  Right lower lobe endobronchial pulmonary nodule measuring 0.2 x 0.2 cm  (Series 6, image 139).  Left interfissural pulmonary nodule measuring 2.2 x 0.2 cm. (Series 6,  image 141).  Left upper lobe mucus plugging image 110 series 6 with tree-in-bud  opacity  Additional scattered sub-4 mm pulmonary nodules.    Abdomen: Examination of the upper abdomen is limited. Abdominal aortic  calcifications    Bones: No suspicious osseous lesion. Degenerative changes of the  thoracic spine with multilevel anterior vertebral spondylosis.      Soft Tissues: No suspicious mass.      Impression    IMPRESSION:   1.  No evidence for pulmonary sarcoid. Scattered nodular areas of  mucus plugging greatest in the left upper lobe with ipsilateral hilar  tiny calcified nodes more suggestive of histoplasmosis.  2.  Scattered sub-5 mm  pulmonary nodules as above. Per Fleischner  guidelines, if patient is high risk follow-up chest CT in 12 months is  recommended. If low risk, no follow-up is required.       I have personally reviewed the examination and initial interpretation  and I agree with the findings.    SON CEE MD         SYSTEM ID:  [id number]

## 2022-08-03 NOTE — PLAN OF CARE
"Vital signs:  Temp: (!) 95  F (35  C) Temp src: Oral BP: (!) 145/66 Pulse: 77   Resp: 18 SpO2: 95 % O2 Device: None (Room air)   Height: 157.5 cm (5' 2\") Weight: 59.4 kg (131 lb)    [id number]:    Activity: Up independently to bathroom.   Neuros: A & O x4. Neuro intact.   Cardiac: WDL. Asymptomatic.   Respiratory: LS clear. O2 sats above 92% on RA. Denies SOB. Unlabored.   GI/: BS+, passing flatus, no BM. Voiding, not saving.   Diet: Regular diet.  Skin: Right periorbital swelling and redness decreased per patient. Patient able to open both eyes. Double vision still present per patient. PERRLA.   Lines: PIV TKO. Zosyn infused per orders.  Incisions/Drains: None.    Labs: None.   Pain/nausea: Denies pain, declined Tylenol. Denies nausea.   New changes this shift: None.   Plan: Continue POC.     "

## 2022-08-03 NOTE — PROGRESS NOTES
[facility]    Medicine Progress Note - Hospitalist Service, GOLD TEAM 8    Date of Admission:  7/30/2022    Assessment & Plan                   Alfreda Wing is a 67 year old female admitted on 7/30/2022. She has a past medical history significant for osteoporosis, alcohol use, insomnia and seasonal allergies who presented to the ED with right eye periorbital edema and proptosis as well as binocular diplopia with clinical concerns for orbital cellulitis. Imaging concerning for a systemic inflammatory syndrome, though she did improve on antibiotics. Appreciate recommendations from ophthalmology and neurology.    Possible Orbital cellulitis of right eye vs systemic inflammatory syndrome  Right proptosis  Increased pain, swelling and redness of right in with proptosis in setting of allergies and sinus congestion. No fevers. CT orbits demonstrating right proptosis and mild mucosal thickening of bilateral maxillary sinuses and ethmoids. Ophthalmology consulted in ED who recommended treating for likely orbital cellulitis.   - Ophthalmology following:   -MR brain and orbit-MRV to evaluate cavernous sinus. No evidence of sinus venous thrombosis, but possible pachymeningeal enhancement concerning for polyneuropathy secondary to a systemic inflammatory process.    - ?Possible connection between symptoms and rare reaction to bisphosphonate therapy (recently initiated). Hold bisphosphonate for now.   - Neurology consulted, appreciate recs   - Follow up labs ordered by ophthalmology: TB, lyme, LDH, ESR, IgG, ANCA, Lysozyme, ACE  - IV zosyn 4.5g q8h while awaiting further recommendations  - Start 1g methylpred daily per Ophthalmology and assess response   - BCx2 (will be post-zosyn x1) NGTD  - Tylenol 975 mg q6h PRN, ibuprofen q6h PRN, oxycodone 5 mg q4h PRN for severe pain    Seasonal allergies  Have been worse this year. Seen by allergist and PCP in week leading up to admission who  felt symptoms related to allergies vs sinus infection. Had been prescribed montelukast but insurance wouldn't cover.   - Continue PTA zyrtec    Alcohol use disorder, in early remission  Has struggled with alcohol use and working with PCP for resources. Last drink on 7/25. Prior to this drinking 2-3 bottles of wine approximately 4 days per week. No withdrawal symptoms.     Insomnia - Continue PTA trazodone 100 mg at bedtime  Osteoporosis - Hold bisphosphonate as above          Diet: Regular Diet Adult    DVT Prophylaxis: Enoxaparin (Lovenox) SQ  Johnson Catheter: Not present  Central Lines: None  Cardiac Monitoring: None  Code Status: No CPR- Pre-arrest intubation OK      Disposition Plan      Expected Discharge Date: 08/04/2022        Discharge Comments: Right eye orbital swelling, started on IV Abx, optho following        The patient's care was discussed with the Patient and Patient's Family.    Ruth Sewell MD  Hospitalist Service, [address]  Securely message with the Vocera Web Console (learn more here)  Text page via [facility] Paging/Directory   Please see signed in provider for up to date coverage information      Clinically Significant Risk Factors Present on Admission                     ______________________________________________________________________    Interval History   No events overnight  Continues to feel better, eye pain is less, still has pain with eye movements  Swelling improved  No vision changes   No fever or chills   Otherwise no concerns or questions. We reviewed the results of the MRI scan.     Data reviewed today: I reviewed all medications, new labs and imaging results over the last 24 hours. I personally reviewed no images or EKG's today.    Physical Exam   Vital Signs: Temp: 96.8  F (36  C) Temp src: Oral BP: (!) 152/58 Pulse: 66   Resp: 18 SpO2: 95 % O2 Device: None (Room air)    Weight: 131 lbs 0 oz    General: Awake, alert,  no distress. Pleasant and conversant  Eyes: R eye proptosis and periorbital edema. Mild conjunctival injection.   Mouth: Oropharynx benign, no tonsillar exudate. MMM.   Neck: Supple, full ROM, no adenopathy.  CV: Normal rate and rhythm, normal S1 and S2. No murmurs, rubs, gallops. Radial pulses 2+ and symmetric.  Respiratory: Lungs clear to auscultation bilaterally. No wheezing, rhonchi, or rales.  Abdomen: Soft, non-distended. Non-tender to palpation. No rebound tenderness or guarding. +BS.   MSK: No joint redness, deformity or swelling.   Extremities: No lower extremity edema.   Skin: Warm, dry. No rash on visible skin.   Neuro: Alert, oriented to conversation and situation. Face symmetric, speech intact. Moves all extremities.   Psych: Normal affect.       Data   Recent Labs   Lab 08/02/22  0644 08/01/22  0732 07/31/22  0550   WBC 7.9 7.0 7.5   HGB 12.2 12.4 11.4*   MCV 91 92 93    384 336    140 138   POTASSIUM 4.0 3.7 3.8   CHLORIDE 105 104 105   CO2 24 28 25   BUN 10.2 9.6 8.0   CR 0.83 0.84 0.75   ANIONGAP 10 8 8   DONIS 8.7* 8.8 7.9*   GLC 84 101* 96   ALBUMIN 4.0 4.3 3.8   PROTTOTAL 6.7 7.2 6.3*   BILITOTAL 0.4 0.4 0.4   ALKPHOS 48 54 50   ALT 21 18 19   AST 26 22 21     Recent Results (from the past 24 hour(s))   CT Chest w Contrast    Narrative    EXAMINATION: CT CHEST W CONTRAST, 8/2/2022 3:52 PM    TECHNIQUE:  Helical CT images from the thoracic inlet through the lung  bases were obtained without IV contrast.     COMPARISON: None.    HISTORY: Patient with bilateral orbital inflammation on MR. Eloy for  sarcoidosis    FINDINGS:    Chest:   Mediastinum: Unremarkable thyroid. Patent tracheobronchial tree.  Patulous esophagus. Small hiatal hernia.  The pleura appears  unremarkable. No pneumothorax. Normal heart size. No significant  pericardial effusion. Thoracic aorta caliber within normal limits.  Main pulmonary artery caliber within normal limits.Aortic  atherosclerosis. Calcified left hilar  lymph nodes.    Lungs:    Dependent atelectasis. No focal consolidative opacities..     Right middle lobe anterolateral peripheral pulmonary nodule measuring  0.4 x 0.3 cm which may be mucous plugging. (Series 6, image 150).  Right lower lobe endobronchial pulmonary nodule measuring 0.2 x 0.2 cm  (Series 6, image 139).  Left interfissural pulmonary nodule measuring 2.2 x 0.2 cm. (Series 6,  image 141).  Left upper lobe mucus plugging image 110 series 6 with tree-in-bud  opacity  Additional scattered sub-4 mm pulmonary nodules.    Abdomen: Examination of the upper abdomen is limited. Abdominal aortic  calcifications    Bones: No suspicious osseous lesion. Degenerative changes of the  thoracic spine with multilevel anterior vertebral spondylosis.      Soft Tissues: No suspicious mass.      Impression    IMPRESSION:   1.  No evidence for pulmonary sarcoid. Scattered nodular areas of  mucus plugging greatest in the left upper lobe with ipsilateral hilar  tiny calcified nodes more suggestive of histoplasmosis.  2.  Scattered sub-5 mm pulmonary nodules as above. Per Fleischner  guidelines, if patient is high risk follow-up chest CT in 12 months is  recommended. If low risk, no follow-up is required.       I have personally reviewed the examination and initial interpretation  and I agree with the findings.    SON CEE MD         SYSTEM ID:  [id number]

## 2022-08-03 NOTE — PROGRESS NOTES
OPHTHALMOLOGY CONSULT NOTE  08/01/22    Patient: Alfreda Wing    Interval Summary: Feels like symptoms are markedly better today. Pain with EOMs almost completely resolved. Still minimal diplopia in right gaze but no longer having diplopia in left gaze.     HISTORY OF PRESENTING ILLNESS:     Alfreda Wing is a 67 year old female who is admitted for right eye proptosis. Patient complains of sinus pressure and right eye pain since 2 weeks ago. Patient complains of right facial pain, diplopia, right eye pain, and right eye proptosis since 4 days ago 07/29. Patient is admitted for IV antibiotics on 07/30.      10+ review of systems were otherwise negative except for that which has been stated above.  ASSESSMENT/PLAN:     #Proptosis, right eye.   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  - Alfreda Wing is a 67 year old female who presents for management of right eye proptosis. Patient has concurrent right facial pain and right eye motility deficit. Vision is 20/20 in both eyes. There is no afferent pupillary defect. Motility deficit is primarily on abduction on the right eye with potential superior restriction on both eyes. MRV without evidence of venous sinus thrombosis. MRI 07/21 showed enhancement of retrobulbar fat, left > right eye despite the fact that external symptoms including proptosis and periorbital edema and erythema worse on the right. The bilateral orbital orbital inflammation is concerning form polyneuropathy secondary to a systemic inflammatory process. Further inflammatory and infectious workup and imaging thus far negative. Inflammation may be related to rare bisphosphonate-related orbital inflammation, which is described in the literature as a rare side effect of these medications. Though recent history of sinus infections and mucosal enhancement on imaging could indicate possible concomitant infectious etiology.     Plan:   - Continue IV  methylprednisolone 1g daily until 8/4/22, then plan to transition to PO Prednisone 1mg/kg/day  - Discontinue IV antibiotics  - TSI, CNS demyelinating panel pending  - Continue to hold bisphosphonate treatment  - Ophthalmology to follow      It is our pleasure to participate in this patient's care and treatment. Please contact us with any further questions or concerns.      Luzmaria Park MD  Resident Physician, PGY-2  Department of Ophthalmology      Discussed with Dr. Clarice Sánchez, oculoplastics fellow, and Dr. Craig Badillo, neuro-ophthalmology fellow.     EXAMINATION:     Base Eye Exam     Visual Acuity       Right Left    Near cc 20/20 20/20-2          Tonometry (Tonopen, 1:56 PM)       Right Left    Pressure 20 22          Pupils       Pupils    Right PERRL    Left PERRL          Visual Fields       Left Right     Full Full          Extraocular Movement       Right Left     Full, Ortho Full, Ortho            Slit Lamp and Fundus Exam     External Exam       Right Left    External Tr Proptosis Normal          Slit Lamp Exam       Right Left    Lids/Lashes Normal Ptosis    Conjunctiva/Sclera White and quiet White and quiet    Cornea Clear Clear    Anterior Chamber Deep and quiet Deep and quiet    Iris Round and reactive Round and reactive    Lens PCIOL, PCO inferonasal and superior PCIOL

## 2022-08-03 NOTE — PROGRESS NOTES
Admitting Diagnosis: Orbital cellulitis on R  Activity: Up independently to bathroom.   Neuros: A & O x4. Neuro intact.   Cardiac: WDL. Asymptomatic.   Respiratory: On RA, LS clear. O2 sats above 92%. Denies SOB. Unlabored.   GI/: BS+, passing flatus, Small BM. Voiding, not saving.   Diet: Regular diet.  Skin: Right periorbital swelling and redness decreased per patient. Patient able to open both eyes. Less double vision present per patient. PERRLA.   Lines: PIV TKO.   Incisions/Drains: None.    Labs: None.   Pain/nausea: Denies pain, and  nausea.   New changes this shift: None.   Plan: Zosyn discharge, Continue POC.

## 2022-08-04 VITALS
BODY MASS INDEX: 24.11 KG/M2 | TEMPERATURE: 96.9 F | HEIGHT: 62 IN | WEIGHT: 131 LBS | OXYGEN SATURATION: 98 % | SYSTOLIC BLOOD PRESSURE: 149 MMHG | HEART RATE: 59 BPM | RESPIRATION RATE: 16 BRPM | DIASTOLIC BLOOD PRESSURE: 65 MMHG

## 2022-08-04 LAB
BACTERIA BLD CULT: NO GROWTH
BACTERIA BLD CULT: NO GROWTH

## 2022-08-04 PROCEDURE — 250N000013 HC RX MED GY IP 250 OP 250 PS 637: Performed by: PHYSICIAN ASSISTANT

## 2022-08-04 PROCEDURE — 99239 HOSP IP/OBS DSCHRG MGMT >30: CPT | Performed by: STUDENT IN AN ORGANIZED HEALTH CARE EDUCATION/TRAINING PROGRAM

## 2022-08-04 PROCEDURE — 250N000012 HC RX MED GY IP 250 OP 636 PS 637: Performed by: STUDENT IN AN ORGANIZED HEALTH CARE EDUCATION/TRAINING PROGRAM

## 2022-08-04 RX ORDER — PREDNISONE 20 MG/1
60 TABLET ORAL DAILY
Qty: 30 TABLET | Refills: 0 | Status: SHIPPED | OUTPATIENT
Start: 2022-08-04 | End: 2022-08-11

## 2022-08-04 RX ADMIN — PREDNISONE 60 MG: 50 TABLET ORAL at 12:49

## 2022-08-04 RX ADMIN — CETIRIZINE HYDROCHLORIDE 5 MG: 5 TABLET ORAL at 08:46

## 2022-08-04 ASSESSMENT — ACTIVITIES OF DAILY LIVING (ADL)
ADLS_ACUITY_SCORE: 20

## 2022-08-04 NOTE — PLAN OF CARE
"Vital signs:  Temp: (!) 95.9  F (35.5  C) Temp src: Oral BP: 124/70 Pulse: 86   Resp: 16 SpO2: 95 % O2 Device: None (Room air)   Height: 157.5 cm (5' 2\") Weight: 59.4 kg (131 lb)    1382-1311    Activity: Up independently to bathroom.   Neuros: A & O x4. Neuro intact.   Cardiac: WDL. Asymptomatic.   Respiratory: LS clear. O2 sats above 92% on RA. Denies SOB. Unlabored.   GI/: BS+, passing flatus, no BM. Voiding, not saving.   Diet: Regular diet.  Skin: Mild right periorbital swelling, decreased per patient. Patient able to open both eyes. Denies double vision. PERRLA.   Lines: PIV saline locked.   Incisions/Drains: None.    Labs: None.   Pain/nausea: Denies pain, declined Tylenol. Denies nausea.   New changes this shift: None.   Plan: Continue POC.   "

## 2022-08-04 NOTE — PROGRESS NOTES
Patient discharged to home at 2:31 PM via ambulation. Accompanied by spouse and staff. Discharge instructions reviewed with patient, opportunity offered to ask questions. Prescriptions filled and sent with patient upon discharge. All belongings sent with patient.    Isatu Sogbeh, RN

## 2022-08-04 NOTE — PROGRESS NOTES
OPHTHALMOLOGY CONSULT NOTE  08/04/22    Patient: Alfreda Wing    Interval Summary: Feels great today. Pain with EOMs and double vision has resolved.     HISTORY OF PRESENTING ILLNESS:     Alfreda Wing is a 67 year old female who is admitted for right eye proptosis. Patient complains of sinus pressure and right eye pain since 2 weeks ago. Patient complains of right facial pain, diplopia, right eye pain, and right eye proptosis since 4 days ago 07/29. Patient is admitted for IV antibiotics on 07/30.      10+ review of systems were otherwise negative except for that which has been stated above.  ASSESSMENT/PLAN:     #Proptosis, right eye.   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  - Alfreda Wing is a 67 year old female who presents for management of right eye proptosis. Patient has concurrent right facial pain and right eye motility deficit. Vision is 20/20 in both eyes. There is no afferent pupillary defect. Motility deficit is primarily on abduction on the right eye with potential superior restriction on both eyes. MRV without evidence of venous sinus thrombosis. MRI 07/21 showed enhancement of retrobulbar fat, left > right eye despite the fact that external symptoms including proptosis and periorbital edema and erythema worse on the right. Further inflammatory and infectious workup and imaging thus far negative. Inflammation may be related to rare bisphosphonate-related orbital inflammation, which is described in the literature as a rare side effect of these medications. Though recent history of sinus infections and mucosal enhancement on imaging could indicate possible concomitant infectious etiology.     Plan:   - Discontinue IV methylprednisolone   - Transition to PO Prednisone 1mg/kg/day  - TSI, CNS demyelinating panel pending  - Continue to hold bisphosphonate   - Follow up with ophthalmology in clinic 8/11/22 at 8:00am as scheduled      It is our  pleasure to participate in this patient's care and treatment. Please contact us with any further questions or concerns.      Luzmaria Park MD  Resident Physician, PGY-2  Department of Ophthalmology      Discussed with Dr. Clarice Sánchez, oculoplastics fellow, and Dr. Craig Badillo, neuro-ophthalmology fellow.     EXAMINATION:     Base Eye Exam       Visual Acuity         Right Left    Near cc 20/20 20/20-2              Tonometry (Tonopen, 12:44 PM)         Right Left    Pressure 21 20              Pupils         Pupils    Right PERRL    Left PERRL              Visual Fields         Left Right     Full Full              Extraocular Movement         Right Left     Full, Ortho Full, Ortho                  Slit Lamp and Fundus Exam       External Exam         Right Left    External Tr Proptosis Normal              Slit Lamp Exam         Right Left    Lids/Lashes Normal Ptosis    Conjunctiva/Sclera White and quiet White and quiet    Cornea Clear Clear    Anterior Chamber Deep and quiet Deep and quiet    Iris Round and reactive Round and reactive    Lens PCIOL, PCO inferonasal and superior PCIOL                                   I have not seen or examined the patient, but have reviewed the chart and key elements of this encounter. I concur with the resident's assessment and plan.    Craig Badillo MD PhD  Fellow, Neuro-Ophthalmology

## 2022-08-04 NOTE — PLAN OF CARE
"Goal Outcome Evaluation:    Plan of Care Reviewed With: patient      BP (!) 143/56   Pulse 86   Temp (!) 96.3  F (35.7  C) (Oral)   Resp 16   Ht 1.575 m (5' 2\")   Wt 59.4 kg (131 lb)   SpO2 96%   BMI 23.96 kg/m          Admitting Diagnosis: Right periorbital cellulitis   Activity: Up independently to bathroom, ambulated the villanueva X 2  Neuros: A & O x4. Neuro intact, calls appropriately  Cardiac: WDL. Denies cardiac chest pain  Respiratory: On RA, LS clear. O2 sats above 92%. Denies SOB. Unlabored.   GI/: BS+, passing flatus, no bowel movement this shift, Voiding, not saving.   Diet: Regular diet.  Skin: Right periorbital swelling and redness decreased per patient. Patient able to open both eyes. Less double vision present per patient.    Lines: L PIV TKO.   Incisions/Drains: None.    Labs: Reviewed  Pain/nausea: Denies pain, and  nausea.   New changes this shift: No new change this shift  Plan: Continue with current POC.             "

## 2022-08-05 ENCOUNTER — PATIENT OUTREACH (OUTPATIENT)
Dept: CARE COORDINATION | Facility: CLINIC | Age: 67
End: 2022-08-05

## 2022-08-05 DIAGNOSIS — Z71.89 OTHER SPECIFIED COUNSELING: ICD-10-CM

## 2022-08-05 LAB
H CAPSUL AG UR QL IA: NOT DETECTED
H CAPSUL AG UR-MCNC: NOT DETECTED NG/ML

## 2022-08-05 NOTE — PROGRESS NOTES
Clinic Care Coordination Contact  [facility]/Voicemail       Clinical Data: Care Coordinator Outreach    Outreach attempted x 1.  Left message on patient's voicemail with call back information and requested return call.    Plan: Care Coordinator will try to reach patient again in 1-2 business days.    Kathy Haywood  [facility]  [facility]

## 2022-08-06 NOTE — PROGRESS NOTES
Clinic Care Coordination Contact  [facility]/Voicemail       Clinical Data: Care Coordinator Outreach  Outreach attempted x 2.  Left message on patient's voicemail with call back information and requested return call.  Plan:  Care Coordinator will do no further outreaches at this time.    Bianca TURNER Community Health Worker  Clinic Care Coordination  [facility]  Phone: [phone]

## 2022-08-07 NOTE — DISCHARGE SUMMARY
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  No follow up labs or test are needed.    Follow up with Ophthalmology Dr. Park on 8/10/22.       Appointments on [place] and/or [facility] (with [facility] or [facility]   provider or service). Call [phone] if you haven't heard regarding   these appointments within 7 days of discharge.             Unresulted Labs Ordered in the Past 30 Days of this Admission     Date and Time Order Name Status Description    8/3/2022  7:50 AM CDS1: CNS Demyelinating Disease Evaluation, Serum: [place] Miscellaneous Test In process     8/2/2022  2:34 PM Thyroid stimulating immunoglobulin In process       These results will be followed up by Ophthalmology and Hospitalist.     Discharge Disposition   Discharged to home  Condition at discharge: Good    Hospital Course   Alfreda Wing is a 67 year old female admitted on 7/30/2022. She has a past medical history significant for osteoporosis, alcohol use disorder, insomnia and seasonal allergies who presented to the ED with right eye periorbital edema and proptosis as well as binocular diplopia with clinical concerns for orbital cellulitis. Imaging with findings of b/l orbital inflammation,  concerning for a more systemic inflammatory syndrome, possibly 2/2 to recent initiation of bisphosphonate therapy.    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 No fevers. CT orbits demonstrating right proptosis and mild mucosal thickening of bilateral maxillary sinuses and ethmoids. Ophthalmology consulted in ED who recommended treating for likely orbital cellulitis. MRI obtained which showed bilateral orbital inflammation, which was more concerning for possible systemic inflammatory disease such as sarcoidosis. CT chest obtained with no signs of pulmonary sarcoid, detailed below. Symptoms are a possible reaction to bisphosphonate therapy (started Fosamax recently) as per [facility] there are rare case reports of orbital inflammation as a side effect.  - Ophthalmology followinged throughout admission   - Initially treated with IV Zosyn for possible orbital cellulitis and had some symptom improvement. However once MRI returned with findings of inflammation, IV steroids were started which rapidly improved symptoms. R eye edema and pain had essentially resolved with two days of IV steroids.   - Transitioned to PO prednisone 60 mg, to continue to until follow up with [facility] on 8/11/22  - Stop bisphosphonate therapy. Added to allergies list.  - Neurology consulted, no need for further neurologic work up   - TB, lyme, LDH, ESR, IgG, ANCA, Lysozyme, ACE all WNL   - BCx2 (will be post-zosyn x1) NGTD  - Tylenol 975 mg q6h PRN, ibuprofen q6h PRN     Nodular areas of mucous plugging on Chest CT  Scattered sub 5mm pulmonary nodules on Chest CT  Chest CT obtained to evaluate for any signs of pulmonary sarcoidosis given findings of orbital inflammation and concern for underlying systemic disease. No signs of sarcoid on CT, however read did note areas of mucous plugging  With tree-in-bud  opacities, suggestive of histoplasmosis. Patient does have a chronic cough for the last year, however it is most likely associated with seasonal allergies as the cough is triggered by her known allergens. No SOB, fevers, athralgia, bloody sputum. However given the increased risk now that she is starting high dose steroids, will check urine histoplasma antigen. She is a never smoker however had significant child and young adult [employer] second hand smoke exposure. Plan to repeat Chest CT in 12 months for interval follow up.   - Histoplasma antigen negative   - Repeat Chest CT recommended in 12 months for interval follow up      Seasonal allergies  Have been worse this year. Seen by allergist and PCP in week leading up to admission who felt symptoms related to allergies vs sinus infection. Had been prescribed montelukast but insurance wouldn't cover.   - Continue PTA zyrtec     Alcohol use disorder, in early remission  Has struggled with alcohol use and working with PCP for resources. Last drink on 7/25. Prior to this drinking 2-3 bottles of wine approximately 4 days per week. No withdrawal symptoms.      Insomnia - Continue PTA trazodone 100 mg at bedtime  Osteoporosis - Hold bisphosphonate as above        Consultations This Hospital Stay   NEUROLOGY GENERAL ADULT IP CONSULT  NURSING TO CONSULT FOR VASCULAR ACCESS CARE IP CONSULT  NURSING TO CONSULT FOR VASCULAR ACCESS CARE IP CONSULT    Code Status   Prior    Time Spent on this Encounter   I, Ruth Sewell MD, personally saw the patient today and spent greater than 30 minutes discharging this patient.       Ruth Sewell MD  [facility] [address] [address]  Phone: [phone]  ______________________________________________________________________    Physical Exam   Vital Signs:                   Weight: 131 lbs 0 oz    General: Awake, alert, no distress. Pleasant and conversant  Eyes: R eye proptosis, improved. No  periorbital edema. Sclera anicteric. No conjunctival injection. PERRLA.   Mouth: Oropharynx benign, no tonsillar exudate. MMM.   Neck: Supple, full ROM, no adenopathy.  CV: Normal rate and rhythm, normal S1 and S2. No murmurs, rubs, gallops. Radial pulses 2+ and symmetric.  Respiratory: Lungs clear to auscultation bilaterally. No wheezing, rhonchi, or rales.  Abdomen: Soft, non-distended. Non-tender to palpation. No rebound tenderness or guarding. +BS.   MSK: No joint redness, deformity or swelling.   Extremities: No lower extremity edema.   Skin: Warm, dry. No rash on visible skin.   Neuro: Alert, oriented to conversation and situation. Face symmetric, speech intact. Moves all extremities.   Psych: Normal affect.          Primary Care Physician   Gretchen Núñez    Discharge Orders      Reason for your hospital stay    Eye inflammation     Activity    Your activity upon discharge: activity as tolerated     Adult [facility] Follow-up and recommended labs and tests    Follow up with primary care provider, Gretchen Núñez, within 7 days for hospital follow- up.  No follow up labs or test are needed.    Follow up with Ophthalmology Dr. Park on 8/10/22.       Appointments on [place] and/or [facility] (with [facility] or [facility] provider or service). Call [phone] if you haven't heard regarding these appointments within 7 days of discharge.     Diet    Follow this diet upon discharge: Regular       Significant Results and Procedures   Most Recent 3 CBC's:Recent Labs   Lab Test 08/02/22  0644 08/01/22  0732 07/31/22  0550   WBC 7.9 7.0 7.5   HGB 12.2 12.4 11.4*   MCV 91 92 93    384 336     Most Recent 3 BMP's:Recent Labs   Lab Test 08/02/22  0644 08/01/22  0732 07/31/22  0550    140 138   POTASSIUM 4.0 3.7 3.8   CHLORIDE 105 104 105   CO2 24 28 25   BUN 10.2 9.6 8.0   CR 0.83 0.84 0.75   ANIONGAP 10 8 8   DONIS 8.7* 8.8 7.9*   GLC 84 101* 96     Most Recent 2 LFT's:Recent Labs   Lab Test 08/02/22  0644  08/01/22  0732   AST 26 22   ALT 21 18   ALKPHOS 48 54   BILITOTAL 0.4 0.4   ,   Results for orders placed or performed during the hospital encounter of 07/30/22   CT Orbits wo Contrast    Addendum: 7/31/2022    Although there does not appear to be definite enhancement within the  orbits, there is haziness in the intraconal fat bilaterally, right  greater than left. Further evaluation with MRI could be of value.    PRINCE BRUCE MD         SYSTEM ID:  [id number]      Narrative    EXAM: CT ORBITAL W/O CONTRAST  7/30/2022 2:57 PM     HISTORY:  Question of right proptosis       COMPARISON: None available      TECHNIQUE: Using thin collimation multidetector helical acquisition  technique, axial, coronal, sagittal CT images were obtained through  the orbits and upper facial bones, following intravenous  administration of nonionic iodinated contrast medium.    CONTRAST: Isovue 370    FINDINGS: No preseptal, extraconal, or intraconal edema. No abnormal  enhancement. No intraorbital mass, hematoma or air. Intact globes  bilaterally. Mild right proptosis. Bilateral pseudophakia.     No orbital wall fracture. Mild mucosal thickening of bilateral  maxillary sinuses and ethmoid air cells, otherwise the visualized  portion of the paranasal sinuses and mastoid air cells are clear. .  Intact cribriform plate. Visualized dentition is within normal limits.    No focal abnormality of the visualized intracranial structures.      Impression    IMPRESSION: Right proptosis. Otherwise unremarkable exam.    I have personally reviewed the examination and initial interpretation  and I agree with the findings.    PRINCE BRUCE MD         SYSTEM ID:  [id number]   MRV Brain wo & w Contrast    Narrative    EXAM: MRV BRAIN  W/O & W CONTRAST  7/31/2022 8:20 PM     HISTORY:  Concern for Cavernous sinus syndrome - right proptosis,  ptosis, V2 paresthesia, abduction palsy       COMPARISON:  CT 7/30/2022    TECHNIQUE: Head MRI: Noncontrast T1-weighted,  T2-weighted, FLAIR,  diffusion-weighted, and susceptibility weighted images of the brain  obtained. Postcontrast T1 weighted images were obtained after  gadolinium-based intravenous contrast administration.  Head MRV: 2D time-of-flight MR venogram (MRV) of the head was  performed without intravenous contrast. Following intravenous  gadolinium-based contrast administration, a contrast enhanced MRV of  the intracranial vessels was performed.    Axial T2-weighted with inversion recovery and coronal T1-weighted  images were obtained without intravenous contrast. Axial and coronal  T1-weighted images with fat saturation were obtained after intravenous  gadolinium administration.    CONTRAST: 5.9 mL IV Gadavist.    FINDINGS:    MRV: The dural venous sinuses are patent. Normal appearance of the  cavernous sinuses. No evidence of thrombus or mass in the cavernous  sinuses.    Orbits: Right proptosis. Mild T2 hyperintense signal in the  retrobulbar fat bilaterally, left greater than right, without discrete  mass or fluid collection. There is more than expected postcontrast  enhancement in the retrobulbar fat bilaterally, more pronounced on the  right. The extraocular muscles are normal in size. There is mild  enhancement surrounding the intraorbital optic nerves more pronounced  on the right (series 25 image 13). Normal appearance of the optic  chiasm.    Mild mucosal thickening in the ethmoid air cells bilaterally, slightly  more pronounced on the right. Mild mucosal thickening in the lower  maxillary sinuses bilaterally.    Brain: There is no mass effect, midline shift, or intracranial  hemorrhage. The ventricles are proportionate to the cerebral sulci.  Diffusion and susceptibility weighted images are negative for  acute/focal abnormality. Major intracranial vascular structures are  within normal limits.    No suspicious abnormality of the skull marrow signal. Mastoid air  cells are clear. No focal abnormality of the  pituitary gland, sella,  skull base and upper cervical spine on sagittal images.      Impression    IMPRESSION:  1. Abnormal T2 and enhancing signal in the retrobulbar fat and right  proptosis. There is also more mild abnormal signal and enhancement in  the left orbital fat. Although there is mild ethmoid sinusitis, it  seems unlikely that there would be bilateral orbital cellulitis. An  inflammatory process such as sarcoidosis could have this appearance or  potentially bilateral orbital pseudotumor.  2. No evidence for thrombus or mass involving the cavernous sinuses.    I have personally reviewed the examination and initial interpretation  and I agree with the findings.    PRINCE BRUCE MD         SYSTEM ID:  [id number]   CT Chest w Contrast    Narrative    EXAMINATION: CT CHEST W CONTRAST, 8/2/2022 3:52 PM    TECHNIQUE:  Helical CT images from the thoracic inlet through the lung  bases were obtained without IV contrast.     COMPARISON: None.    HISTORY: Patient with bilateral orbital inflammation on MR. Eval for  sarcoidosis    FINDINGS:    Chest:   Mediastinum: Unremarkable thyroid. Patent tracheobronchial tree.  Patulous esophagus. Small hiatal hernia.  The pleura appears  unremarkable. No pneumothorax. Normal heart size. No significant  pericardial effusion. Thoracic aorta caliber within normal limits.  Main pulmonary artery caliber within normal limits.Aortic  atherosclerosis. Calcified left hilar lymph nodes.    Lungs:    Dependent atelectasis. No focal consolidative opacities..     Right middle lobe anterolateral peripheral pulmonary nodule measuring  0.4 x 0.3 cm which may be mucous plugging. (Series 6, image 150).  Right lower lobe endobronchial pulmonary nodule measuring 0.2 x 0.2 cm  (Series 6, image 139).  Left interfissural pulmonary nodule measuring 2.2 x 0.2 cm. (Series 6,  image 141).  Left upper lobe mucus plugging image 110 series 6 with tree-in-bud  opacity  Additional scattered sub-4 mm  pulmonary nodules.    Abdomen: Examination of the upper abdomen is limited. Abdominal aortic  calcifications    Bones: No suspicious osseous lesion. Degenerative changes of the  thoracic spine with multilevel anterior vertebral spondylosis.      Soft Tissues: No suspicious mass.      Impression    IMPRESSION:   1.  No evidence for pulmonary sarcoid. Scattered nodular areas of  mucus plugging greatest in the left upper lobe with ipsilateral hilar  tiny calcified nodes more suggestive of histoplasmosis.  2.  Scattered sub-5 mm pulmonary nodules as above. Per Fleischner  guidelines, if patient is high risk follow-up chest CT in 12 months is  recommended. If low risk, no follow-up is required.       I have personally reviewed the examination and initial interpretation  and I agree with the findings.    SON CEE MD         SYSTEM ID:  [id number]       Discharge Medications   Discharge Medication List as of 8/4/2022  1:09 PM      START taking these medications    Details   omeprazole (PRILOSEC) 20 MG DR capsule Take 1 capsule (20 mg) by mouth daily, Disp-30 capsule, R-0, E-Prescribe      predniSONE (DELTASONE) 20 MG tablet Take 3 tablets (60 mg) by mouth daily for 10 days, Disp-30 tablet, R-0, E-Prescribe         CONTINUE these medications which have NOT CHANGED    Details   Azelastine HCl 137 MCG/SPRAY SOLN Spray 137 mcg in nostril daily as needed, Historical      biotin 1000 MCG TABS tablet Take 1,000 mcg by mouth daily, Historical      fexofenadine (ALLEGRA) 180 MG tablet Take 180 mg by mouth daily, Historical      traZODone (DESYREL) 100 MG tablet Take 100 mg by mouth At Bedtime, Historical         STOP taking these medications       alendronate (FOSAMAX) 70 MG tablet Comments:   Reason for Stopping:             Allergies   Allergies   Allergen Reactions     Clarithromycin Hives     Doxycycline Hives     Sulfa Drugs Hives

## 2022-08-08 LAB — MAYO MISC RESULT: NORMAL

## 2022-08-10 LAB — TSI SER-ACNC: <1 TSI INDEX

## 2022-08-11 ENCOUNTER — OFFICE VISIT (OUTPATIENT)
Dept: OPHTHALMOLOGY | Facility: CLINIC | Age: 67
End: 2022-08-11
Attending: OPHTHALMOLOGY
Payer: COMMERCIAL

## 2022-08-11 DIAGNOSIS — H05.00 ORBITAL INFLAMMATION, ACUTE: ICD-10-CM

## 2022-08-11 DIAGNOSIS — H05.10 ORBITAL INFLAMMATION: Primary | ICD-10-CM

## 2022-08-11 PROBLEM — F10.20 ALCOHOL DEPENDENCE, DAILY USE (H): Status: ACTIVE | Noted: 2022-01-04

## 2022-08-11 PROBLEM — F10.982 ALCOHOL-INDUCED INSOMNIA (H): Status: ACTIVE | Noted: 2022-01-04

## 2022-08-11 PROBLEM — N95.2 VAGINAL ATROPHY: Status: ACTIVE | Noted: 2022-01-04

## 2022-08-11 PROBLEM — F32.0 MAJOR DEPRESSIVE DISORDER, SINGLE EPISODE, MILD (H): Status: ACTIVE | Noted: 2022-01-03

## 2022-08-11 PROCEDURE — G0463 HOSPITAL OUTPT CLINIC VISIT: HCPCS

## 2022-08-11 PROCEDURE — 99214 OFFICE O/P EST MOD 30 MIN: CPT | Mod: GC | Performed by: STUDENT IN AN ORGANIZED HEALTH CARE EDUCATION/TRAINING PROGRAM

## 2022-08-11 RX ORDER — CETIRIZINE HYDROCHLORIDE 10 MG/1
5 TABLET ORAL
COMMUNITY

## 2022-08-11 RX ORDER — PREDNISONE 20 MG/1
TABLET ORAL
Qty: 30 TABLET | Refills: 0 | Status: SHIPPED | OUTPATIENT
Start: 2022-08-11 | End: 2022-08-13

## 2022-08-11 ASSESSMENT — VISUAL ACUITY
OD_SC: 20/20
OS_SC: 20/20
OD_SC+: -2
METHOD: SNELLEN - LINEAR
OS_SC+: -1

## 2022-08-11 ASSESSMENT — CONF VISUAL FIELD
OD_NORMAL: 1
OS_NORMAL: 1

## 2022-08-11 ASSESSMENT — TONOMETRY
OD_IOP_MMHG: 14
IOP_METHOD: TONOPEN
OS_IOP_MMHG: 11

## 2022-08-11 ASSESSMENT — SLIT LAMP EXAM - LIDS
COMMENTS: NORMAL
COMMENTS: NORMAL

## 2022-08-11 ASSESSMENT — EXTERNAL EXAM - LEFT EYE: OS_EXAM: NORMAL

## 2022-08-11 NOTE — NURSING NOTE
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 Vision is stable. Pt has concerns for sharp pain in eyebrow over eyebrow, cheek, right side of nose. She feels she may have pulled a muscle or soft tissue damage. Last happened at 5 am this morning- pain runs from 3/10. Pt takes OTC meds for pain with relief.   ARY DAVIS 8:10 AM August 11, 2022

## 2022-08-11 NOTE — PROGRESS NOTES
Ophthalmology Acute Clinic     Chief Complaint(s) and History of Present Illness(es)     Follow Up     In both eyes.  Associated symptoms include pain with eye movement.  Negative for eye pain, headache, flashes and floaters. 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 Vision is stable. Pt has concerns for sharp pain in eyebrow over eyebrow, cheek, right side of nose. She feels she may have pulled a muscle or soft tissue damage. Last happened at 5 am this morning- pain runs from 3/10. Pt takes OTC meds for pain with relief.   ARY DAVIS 8:10 AM August 11, 2022                    HPI:   Alfreda Wing is a 67 year old female who presents for follow up of orbital inflammation. She was recently admitted for IV steroids/IV antibiotics for bilateral orbital inflammation and multiple CN palsies. Etiology was presumed inflammatory vs infectious. She was discharged on 8/4/22 on PO prednisone 1mg/kg/day. She has been taking 60mg prednisone (was sent with 10 day course).    Today, she states things have been much better since discharging from the hospital. She does note intermittent sharp pain on the bone around the right eye. The pain started before she was hospitalized. Usually last about 45 min to an hour and only occurs when she awakes in the morning. No tenderness to palpation on exam. Tylenol helps with the pain. She does have a history of migraines but this feels different than her migraine episodes.       Past Ocular History:  Last eye exam: Fall 2021, follows with [facility]  Prior eye surgery/laser: Cataract extraction both eyes in 2018  Contact lens wear: None  Glasses: [place]  Eyedrops: None     Family History:  Mother, aunt, uncle with AMD. Father with macular hole?     Social History:  Never smoker. ,  in  the room.     Medical History  - Osteoporosis: started weekly fosamax 1 week ago  - No diabetes or HTN    Review of systems for the eyes was negative other than the pertinent positives/negatives listed in the HPI.      Imaging and Labs:     MRV Brain w/o and with contrast 7/31/22:    IMPRESSION:  1. Abnormal T2 and enhancing signal in the retrobulbar fat and right  proptosis. There is also more mild abnormal signal and enhancement in  the left orbital fat. Although there is mild ethmoid sinusitis, it  seems unlikely that there would be bilateral orbital cellulitis. An  inflammatory process such as sarcoidosis could have this appearance or  potentially bilateral orbital pseudotumor.  2. No evidence for thrombus or mass involving the cavernous sinuses.    CT Chest with contrast 8/2/22:    IMPRESSION:   1.  No evidence for pulmonary sarcoid. Scattered nodular areas of  mucus plugging greatest in the left upper lobe with ipsilateral hilar  tiny calcified nodes more suggestive of histoplasmosis.  2.  Scattered sub-5 mm pulmonary nodules as above. Per Fleischner  guidelines, if patient is high risk follow-up chest CT in 12 months is  recommended. If low risk, no follow-up is required.    Negative/Normal Labs: Histoplasma, CDS1, TSH, TSI, treponema abs, TB, lyme, ESR, CRP, ANCA, IGG4, lysozyme, ACE, YVONNE    Assessment & Plan      Alfreda Wing is a 67 year old female with the following diagnoses:   1. Orbital inflammation    2. Orbital inflammation, acute       Patient doing well on PO prednisone. EOMs full today without pain. VA stable. Lab results collected during hospital admission all negative (see above). Etiology still not fully clear. Inflammation may have been related to rare bisphosphonate-related orbital inflammation, which is described in the literature as a rare side effect of these medications. Though recent history of sinus infections and mucosal enhancement on imaging could indicate possible concomitant  infectious etiology. Regardless, patient improved and stable since discharge.    Plan:  - Taper PO Prednisone. 60mg for 7 days, 40mg for 7 days, 30mg for 7 days, 20mg for 7 days, 10mg for 7 days, then stop.  - Continue Prilosec while on steroid taper  - Discussed return precautions including vision loss, worsening pain with EOMs, return of swelling, etc.     Patient disposition:   Return for Oculoplastics clinic in 4-6 weeks.    Patient seen with Dr. Adolfo Park MD  Resident Physician, PGY-2  Department of Ophthalmology  08/11/22 8:16 AM    Attending Physician Attestation:  Complete documentation of historical and exam elements from today's encounter can be found in the full encounter summary report (not reduplicated in this progress note).  I personally obtained the chief complaint(s) and history of present illness.  I confirmed and edited as necessary the review of systems, past medical/surgical history, family history, social history, and examination findings as documented by others; and I examined the patient myself.  I personally reviewed the relevant tests, images, and reports as documented above.  I formulated and edited as necessary the assessment and plan and discussed the findings and management plan with the patient and family. . - Fidel Mata MD

## 2022-08-13 RX ORDER — PREDNISONE 20 MG/1
TABLET ORAL
Qty: 57 TABLET | Refills: 0 | Status: SHIPPED | OUTPATIENT
Start: 2022-08-13 | End: 2022-09-17

## 2022-08-13 NOTE — TELEPHONE ENCOUNTER
PREDNISONE 20 MG TABLET  Needing 57 tablets to complete this course.  Please review and send new order.    Minerva Omalley RN  Central Triage Red Flags/Med Refills

## 2022-08-18 ENCOUNTER — TELEPHONE (OUTPATIENT)
Dept: OPHTHALMOLOGY | Facility: CLINIC | Age: 67
End: 2022-08-18

## 2022-08-18 NOTE — TELEPHONE ENCOUNTER
[facility] Call Center    Phone Message    May a detailed message be left on voicemail: yes     Reason for Call: Appointment Intake    Referring Provider Name: n/a  Diagnosis and/or Symptoms: Right eye inflammation    Alfreda called to schedule a follow up with Dr. Park for her right eye inflammation. She noticed that there has been some tearing on the right eye & it is very red and irritated. She reports pain and pressure in her eye again. She is wanting to schedule a follow up with Dr. Luzmaria Park. Per protocols, writer sending message to the clinic for scheduling with resident. Please reach out to Alfreda to discuss scheduling. Thank you!    Action Taken: Message routed to:  [facility] eye    Travel Screening: Not Applicable

## 2022-08-18 NOTE — TELEPHONE ENCOUNTER
I spoke to the patient who note she has redness and irritation of the right eye.  I reviewed her symptoms with her provider who would like her to try artificial tears and tylenol.  The patient agrees to try the recommendations.  I offered to make an appointment for next week but she prefers to touch base with us on Monday if symptoms don't improve.

## 2022-08-24 ENCOUNTER — TELEPHONE (OUTPATIENT)
Dept: OPHTHALMOLOGY | Facility: CLINIC | Age: 67
End: 2022-08-24

## 2022-08-24 NOTE — TELEPHONE ENCOUNTER
Spoke to pt at 1600    Pt on 30 mg oral prednisone since Monday and needing 5 more pills to finish taper.    During call pt states pharmacy messaged and Rx able to be picket up and pt states will be able to complete taper now    Pt aware to contact clinic if running out of oral prednisone before taper ends    Blaine Barney RN 4:02 PM 08/24/22    --      [facility] Call Center    Phone Message    May a detailed message be left on voicemail: yes     Reason for Call: Medication Question or concern regarding medication   Prescription Clarification  Name of Medication: PREDNISONE  Prescribing Provider: BROWN   Pharmacy: [facility] #[id number] - [place], [place] - [address] AT [facility]   What on the order needs clarification? Pt states that the count and dosage did not match up. Pt needs 5 pills, 60mg each in order to finish up her treatment. Pt was to taper down on this rx and the math is not adding up. Please follow-up with pt or pharmacy directly.     Action Taken: Other:  eye    Travel Screening: Not Applicable                                                                         
12-Oct-2017

## 2022-09-16 ENCOUNTER — OFFICE VISIT (OUTPATIENT)
Dept: OPHTHALMOLOGY | Facility: CLINIC | Age: 67
End: 2022-09-16
Payer: COMMERCIAL

## 2022-09-16 DIAGNOSIS — H05.10 ORBITAL INFLAMMATION: Primary | ICD-10-CM

## 2022-09-16 PROCEDURE — 99203 OFFICE O/P NEW LOW 30 MIN: CPT | Performed by: STUDENT IN AN ORGANIZED HEALTH CARE EDUCATION/TRAINING PROGRAM

## 2022-09-16 ASSESSMENT — SLIT LAMP EXAM - LIDS
COMMENTS: NORMAL
COMMENTS: NORMAL

## 2022-09-16 ASSESSMENT — TONOMETRY
IOP_METHOD: ICARE
OD_IOP_MMHG: 13
OS_IOP_MMHG: 12

## 2022-09-16 ASSESSMENT — VISUAL ACUITY
METHOD: SNELLEN - LINEAR
OD_SC: 20/15
OD_SC+: -1
OS_SC: 20/20

## 2022-09-16 ASSESSMENT — CONF VISUAL FIELD
OD_NORMAL: 1
METHOD: COUNTING FINGERS
OS_NORMAL: 1

## 2022-09-16 ASSESSMENT — EXTERNAL EXAM - LEFT EYE: OS_EXAM: NORMAL

## 2022-09-16 NOTE — PROGRESS NOTES
Chief Complaints and History of Present Illnesses   Patient presents with     Ocular Proptosis Follow Up     Chief Complaint(s) and History of Present Illness(es)     Ocular Proptosis Follow Up     In right upper lid.  Severity is moderate.  Disease is chronic.  Since   onset it is gradually improving.  Associated signs and symptoms include   eye pain (very mild pain <1 in RUL only).  Negative for redness, eyelid   swelling and neck pain.  Treatments tried include eye drops.              Comments     Pt here today for orbital inflammation follow up.  Pt states completed prednisone therapy - tapered as directed.  Previously was having noticeable pain in RE - managed with tylenol &   artificial tears.  Pt reports very minimal pain at this visit.    Ocular meds = artificial tears prn    Ava MCALLISTER, KERI 12:04 PM 09/16/2022                        Assessment & Plan     Alfreda Wing is a 67 year old female with the following diagnoses:   1. Orbital inflammation         Patient following up for bisphosphonate-related orbital inflammation. She feels she is back to baseline -- Complete resolution of pain with EOMs, denies diplopia.    She completed her oral steroid taper 5 days ago.  Has been feel well since then. Has mild tenderness to palpation in supraorbital notch. Discussed options of steroid injection for possible trochleiitis vs monitoring, she opts to monitor at this time. Continue ATs PRN.    Follow up 4-6 weeks to evaluate symptoms after being off of steroids for ~1 month            Clarice Sánchez MD  Oculoplastics Fellow    Attending Physician Attestation: Complete documentation of historical and exam elements from today's encounter can be found in the full encounter summary report (not reduplicated in this progress note). I personally obtained the chief complaint(s) and history of present illness. I confirmed and edited as necessary the review of systems, past medical/surgical history, family history,  social history, and examination findings as documented by others; and I examined the patient myself. I personally reviewed the relevant tests, images, and reports as documented above. I formulated and edited as necessary the assessment and plan and discussed the findings and management plan with the patient  -Clarice Sánchez MD

## 2022-09-16 NOTE — NURSING NOTE
Chief Complaints and History of Present Illnesses   Patient presents with     Ocular Proptosis Follow Up     Chief Complaint(s) and History of Present Illness(es)     Ocular Proptosis Follow Up     Laterality: right upper lid    Severity: moderate    Onset: chronic    Course: gradually improving    Associated signs and symptoms: eye pain (very mild pain <1 in RUL only).  Negative for redness, eyelid swelling and neck pain    Treatments tried: eye drops              Comments     Pt here today for orbital inflammation follow up.  Pt states completed prednisone therapy - tapered as directed.  Previously was having noticeable pain in RE - managed with tylenol & artificial tears.  Pt reports very minimal pain at this visit.    Ocular meds = artificial tears prn    Ava MCALLISTER, KERI 12:04 PM 09/16/2022

## 2022-10-23 ENCOUNTER — HEALTH MAINTENANCE LETTER (OUTPATIENT)
Age: 67
End: 2022-10-23

## 2022-10-31 ENCOUNTER — OFFICE VISIT (OUTPATIENT)
Dept: OPHTHALMOLOGY | Facility: CLINIC | Age: 67
End: 2022-10-31
Payer: COMMERCIAL

## 2022-10-31 DIAGNOSIS — H05.10 ORBITAL INFLAMMATION: Primary | ICD-10-CM

## 2022-10-31 PROCEDURE — 99213 OFFICE O/P EST LOW 20 MIN: CPT | Performed by: STUDENT IN AN ORGANIZED HEALTH CARE EDUCATION/TRAINING PROGRAM

## 2022-10-31 ASSESSMENT — CONF VISUAL FIELD
OS_SUPERIOR_NASAL_RESTRICTION: 0
OD_SUPERIOR_TEMPORAL_RESTRICTION: 0
OS_INFERIOR_NASAL_RESTRICTION: 0
OS_SUPERIOR_TEMPORAL_RESTRICTION: 0
OD_INFERIOR_NASAL_RESTRICTION: 0
OS_INFERIOR_TEMPORAL_RESTRICTION: 0
OD_INFERIOR_TEMPORAL_RESTRICTION: 0
METHOD: COUNTING FINGERS
OD_NORMAL: 1
OS_NORMAL: 1
OD_SUPERIOR_NASAL_RESTRICTION: 0

## 2022-10-31 ASSESSMENT — VISUAL ACUITY
OS_SC: 20/20
OS_SC+: -2
OD_SC+: -2
METHOD: SNELLEN - LINEAR
OD_SC: 20/20

## 2022-10-31 ASSESSMENT — TONOMETRY
OS_IOP_MMHG: 10
OD_IOP_MMHG: 13
IOP_METHOD: ICARE

## 2022-10-31 ASSESSMENT — SLIT LAMP EXAM - LIDS
COMMENTS: BLEPHARITIS
COMMENTS: BLEPHARITIS

## 2022-10-31 ASSESSMENT — EXTERNAL EXAM - LEFT EYE: OS_EXAM: NORMAL

## 2022-10-31 NOTE — PROGRESS NOTES
"Chief Complaints and History of Present Illnesses   Patient presents with     Follow Up     1 month follow up due to Orbital inflammation right eye after being off Steriod for the past month.           Chief Complaint(s) and History of Present Illness(es)     Follow Up    In right eye.  Associated symptoms include Negative for eye pain, tearing,   discharge and swelling.  Treatments tried include artificial tears.  Pain   was noted as 0/10. Additional comments: 1 month follow up due to Orbital   inflammation right eye after being off Steriod for the past month.                 Comments    Patient states that she is fighting a Cold right now but no issues with   each eye.   Vision each eye remains stable. No swelling with each eye or discharge. \"   Everything seems back to normal otherwise.\".    Hailey Guzman, COT COT 10:09 AM October 31, 2022                         Assessment & Plan     Alfreda Wing is a 67 year old female with the following diagnoses:   1. Orbital inflammation         Patient following up for bisphosphonate-related orbital inflammation.   - She remains stable off of oral steroids and notes complete resolution of pain with EOMs and in supraorbital notch. No diplopia.   - She has proptosis right eye, noted on prior CT. Patient not bothered at this time. Will continue to monitor.  - start lid scrubs BID for blepharitis    Follow up 4 months          Clarice Sánchez MD  Oculoplastics Fellow    "

## 2022-10-31 NOTE — PATIENT INSTRUCTIONS
Blepharitis treatment:  - systane eyelid wipes or ocusoft lid scrubs - use twice a day along eyelashes, then rinse off

## 2022-10-31 NOTE — NURSING NOTE
"Chief Complaints and History of Present Illnesses   Patient presents with     Follow Up     1 month follow up due to Orbital inflammation right eye after being off Steriod for the past month.           Chief Complaint(s) and History of Present Illness(es)     Follow Up            Laterality: right eye    Associated symptoms: Negative for eye pain, tearing, discharge and swelling    Treatments tried: artificial tears    Pain scale: 0/10    Comments: 1 month follow up due to Orbital inflammation right eye after being off Steriod for the past month.                Comments    Patient states that she is fighting a Cold right now but no issues with each eye.   Vision each eye remains stable. No swelling with each eye or discharge. \" Everything seems back to normal otherwise.\".    Hailey Guzman, COT COT 10:09 AM October 31, 2022                      "

## 2023-02-07 ENCOUNTER — TELEPHONE (OUTPATIENT)
Dept: OPHTHALMOLOGY | Facility: CLINIC | Age: 68
End: 2023-02-07
Payer: COMMERCIAL

## 2023-02-08 NOTE — TELEPHONE ENCOUNTER
Spoke with patient regarding rescheduling patient with . Rescheduled patient accordingly and sent reminder letter to confirmed address.-Per Patient

## 2023-03-13 ENCOUNTER — OFFICE VISIT (OUTPATIENT)
Dept: OPHTHALMOLOGY | Facility: CLINIC | Age: 68
End: 2023-03-13
Payer: COMMERCIAL

## 2023-03-13 DIAGNOSIS — H01.00B BLEPHARITIS OF UPPER AND LOWER EYELIDS OF BOTH EYES, UNSPECIFIED TYPE: ICD-10-CM

## 2023-03-13 DIAGNOSIS — H01.00A BLEPHARITIS OF UPPER AND LOWER EYELIDS OF BOTH EYES, UNSPECIFIED TYPE: ICD-10-CM

## 2023-03-13 DIAGNOSIS — H05.10 ORBITAL INFLAMMATION: Primary | ICD-10-CM

## 2023-03-13 PROCEDURE — 99213 OFFICE O/P EST LOW 20 MIN: CPT | Performed by: STUDENT IN AN ORGANIZED HEALTH CARE EDUCATION/TRAINING PROGRAM

## 2023-03-13 RX ORDER — ESTRADIOL 0.1 MG/G
CREAM VAGINAL
COMMUNITY
Start: 2023-01-05

## 2023-03-13 RX ORDER — CALCIUM CARBONATE/VITAMIN D3 500 MG-10
1 TABLET ORAL
COMMUNITY
Start: 2023-01-05

## 2023-03-13 ASSESSMENT — EXTERNAL EXAM - LEFT EYE: OS_EXAM: NORMAL

## 2023-03-13 ASSESSMENT — TONOMETRY
IOP_METHOD: ICARE
OD_IOP_MMHG: 15
OS_IOP_MMHG: 13

## 2023-03-13 ASSESSMENT — VISUAL ACUITY
METHOD: SNELLEN - LINEAR
OS_SC: 20/20
OD_SC: 20/20
OS_SC+: -3

## 2023-03-13 NOTE — NURSING NOTE
Chief Complaints and History of Present Illnesses   Patient presents with     Follow Up     Pt here for 4 month follow up on orbital inflammation.     Chief Complaint(s) and History of Present Illness(es)     Follow Up            Comments: Pt here for 4 month follow up on orbital inflammation.          Comments    Pt notes having two episodes of inflammation x2 since she was here. Pt was using compresses, drops, and scrub. She does report having a cold for 3 weeks around one of the times she had a flare.   Edith Campo COA on 3/13/2023 at 12:19 PM

## 2023-03-13 NOTE — PATIENT INSTRUCTIONS
BLEPHARITIS TREATMENT    Please perform the following:     Eye drops: (if using any medication eye drops wait at least 5 minutes between drops)  -Preservative-free artificial tears 1 drop to both eye(s) 3x/day as needed    -examples include: Refresh Plus, Systane preservative-free, Thera Tears, as well as those made by your favorite stores (just please make sure they say preservative-free on them and are in separate vials/dropperettes)    -please be aware that many of these can be re-capped, so as to reduce waste of these please check your specific tears type    Ointment: (be careful of blurry vision and reduce your chances of falling after placing these, 1/4 inch is approximately the size of a grain of rice)  -Artificial tear ointment 1/4 inch to both eye(s) at bedtime as needed    -examples include: Refresh PM, Lacrilube, etc.    By mouth:   -Fish oil or Flaxseed oil (Omega-3 fatty acids), 1000mg (1 gram) ONCE a day for 2 weeks, THEN increase to TWICE a day if tolerating    -taking these with food may help decrease any stomach irritation/discomfort    Other:   -Hot compresses to both eyes for 5-10 minutes TWICE a day     -these can be performed with over-the-counter gel packs, warm water, or other methods such as using a sock with uncooked rice  -Lid scrubs twice a day with use of baby shampoo as discussed    -may be better after hot compresses are done to remove any oily debris afterwards  - tea tree oil 2 times daily

## 2023-03-13 NOTE — PROGRESS NOTES
Chief Complaints and History of Present Illnesses   Patient presents with     Follow Up     Pt here for 4 month follow up on orbital inflammation.     Chief Complaint(s) and History of Present Illness(es)     Follow Up     Additional comments: Pt here for 4 month follow up on orbital   inflammation.           Comments    Pt notes having two episodes of inflammation x2 since she was here. Pt was   using compresses, drops, and scrub. She does report having a cold for 3   weeks around one of the times she had a flare. She states the eyes burn   and has a rash on lids. Second flare she says was not triggered by   anything. She has changed her diet and has cut out a lot of foods.   Edith Campo, COA on 3/13/2023 at 12:19 PM                      Assessment & Plan     Alfreda Wing is a 68 year old female with the following diagnoses:   1. Orbital inflammation    2. Blepharitis of upper and lower eyelids of both eyes, unspecified type         Bisphosphonate-related orbital inflammation - resolved after stopping bisphosphonates    Blepharitis  - has had 2 episodes of patchy red inflammation along her eyelids. No rashes today  - start lid scrubs BID, tea tree oil  - advised to call next time she has an episode of eyelid inflammation/rash so we can better characterize the rash  - follow up as needed            Clarice Sánchez MD  Oculoplastics Fellow    Attending Physician Attestation: Complete documentation of historical and exam elements from today's encounter can be found in the full encounter summary report (not reduplicated in this progress note). I personally obtained the chief complaint(s) and history of present illness. I confirmed and edited as necessary the review of systems, past medical/surgical history, family history, social history, and examination findings as documented by others; and I examined the patient myself. I personally reviewed the relevant tests, images, and reports as documented above. I  formulated and edited as necessary the assessment and plan and discussed the findings and management plan with the patient  -Clarice Sánchez MD

## 2024-03-24 ENCOUNTER — HEALTH MAINTENANCE LETTER (OUTPATIENT)
Age: 69
End: 2024-03-24

## 2025-04-13 ENCOUNTER — HEALTH MAINTENANCE LETTER (OUTPATIENT)
Age: 70
End: 2025-04-13

## 2025-05-25 ENCOUNTER — HEALTH MAINTENANCE LETTER (OUTPATIENT)
Age: 70
End: 2025-05-25